# Patient Record
Sex: FEMALE | Race: WHITE | Employment: FULL TIME | URBAN - METROPOLITAN AREA
[De-identification: names, ages, dates, MRNs, and addresses within clinical notes are randomized per-mention and may not be internally consistent; named-entity substitution may affect disease eponyms.]

---

## 2017-01-25 PROCEDURE — 87591 N.GONORRHOEAE DNA AMP PROB: CPT | Performed by: OBSTETRICS & GYNECOLOGY

## 2017-01-25 PROCEDURE — 87491 CHLMYD TRACH DNA AMP PROBE: CPT | Performed by: OBSTETRICS & GYNECOLOGY

## 2017-01-25 PROCEDURE — 88175 CYTOPATH C/V AUTO FLUID REDO: CPT | Performed by: OBSTETRICS & GYNECOLOGY

## 2017-02-06 PROCEDURE — 83520 IMMUNOASSAY QUANT NOS NONAB: CPT | Performed by: OBSTETRICS & GYNECOLOGY

## 2017-02-06 PROCEDURE — 84146 ASSAY OF PROLACTIN: CPT | Performed by: OBSTETRICS & GYNECOLOGY

## 2017-02-06 PROCEDURE — 84443 ASSAY THYROID STIM HORMONE: CPT | Performed by: OBSTETRICS & GYNECOLOGY

## 2017-02-06 PROCEDURE — 84144 ASSAY OF PROGESTERONE: CPT | Performed by: OBSTETRICS & GYNECOLOGY

## 2017-02-06 PROCEDURE — 83002 ASSAY OF GONADOTROPIN (LH): CPT | Performed by: OBSTETRICS & GYNECOLOGY

## 2017-02-06 PROCEDURE — 36415 COLL VENOUS BLD VENIPUNCTURE: CPT | Performed by: OBSTETRICS & GYNECOLOGY

## 2017-02-06 PROCEDURE — 83001 ASSAY OF GONADOTROPIN (FSH): CPT | Performed by: OBSTETRICS & GYNECOLOGY

## 2017-02-06 PROCEDURE — 82670 ASSAY OF TOTAL ESTRADIOL: CPT | Performed by: OBSTETRICS & GYNECOLOGY

## 2017-02-15 PROCEDURE — 87510 GARDNER VAG DNA DIR PROBE: CPT | Performed by: OBSTETRICS & GYNECOLOGY

## 2017-02-15 PROCEDURE — 87480 CANDIDA DNA DIR PROBE: CPT | Performed by: OBSTETRICS & GYNECOLOGY

## 2017-02-15 PROCEDURE — 87660 TRICHOMONAS VAGIN DIR PROBE: CPT | Performed by: OBSTETRICS & GYNECOLOGY

## 2017-04-11 PROCEDURE — 88305 TISSUE EXAM BY PATHOLOGIST: CPT | Performed by: OBSTETRICS & GYNECOLOGY

## 2017-06-01 ENCOUNTER — OFFICE VISIT (OUTPATIENT)
Dept: INTERNAL MEDICINE CLINIC | Facility: CLINIC | Age: 32
End: 2017-06-01

## 2017-06-01 VITALS
RESPIRATION RATE: 12 BRPM | DIASTOLIC BLOOD PRESSURE: 60 MMHG | BODY MASS INDEX: 20.98 KG/M2 | HEART RATE: 76 BPM | TEMPERATURE: 98 F | WEIGHT: 114 LBS | HEIGHT: 62 IN | SYSTOLIC BLOOD PRESSURE: 80 MMHG

## 2017-06-01 DIAGNOSIS — E28.2 PCOS (POLYCYSTIC OVARIAN SYNDROME): ICD-10-CM

## 2017-06-01 DIAGNOSIS — Z78.9 ATTEMPTING TO CONCEIVE: ICD-10-CM

## 2017-06-01 DIAGNOSIS — N97.9 INFERTILITY, FEMALE: Primary | ICD-10-CM

## 2017-06-01 PROCEDURE — 99203 OFFICE O/P NEW LOW 30 MIN: CPT | Performed by: INTERNAL MEDICINE

## 2017-06-01 NOTE — PATIENT INSTRUCTIONS
- Wait for us to contact you before you schedule an appointment  - After your visit, if you need us to order blood work, call us and let us know. It was a pleasure seeing you in the clinic today.   Thank you for choosing the [address]

## 2017-06-01 NOTE — PROGRESS NOTES
Caitlyn Henley is a 29year old female. HPI:   Patient presents with:  Referral: to infertility doctor  Patient is a new patient, here to establish care.   She is in the process of setting up with an infertility doctor (Dr. Zoila Yates) and needs She reports that she drinks alcohol. She reports that she does not use illicit drugs.     Wt Readings from Last 6 Encounters:  06/01/17 : 114 lb  04/27/17 : 113 lb 6.4 oz  02/15/17 : 114 lb 9.6 oz  02/08/17 : 115 lb 1.6 oz  02/06/17 : 112 lb 3.2 oz  01/25/1

## 2017-06-09 ENCOUNTER — TELEPHONE (OUTPATIENT)
Dept: INTERNAL MEDICINE CLINIC | Facility: CLINIC | Age: 32
End: 2017-06-09

## 2017-06-09 NOTE — TELEPHONE ENCOUNTER
Agreed. Only other fertility MD I know is Dr. Jesus Tyson out of [place] but I'm not sure if he would be in-network for insurance, best bet would be for her to call insurance directly.   I can enter referral electronically once we have an in-network fe

## 2017-06-09 NOTE — TELEPHONE ENCOUNTER
----- Message -----   Vinh Humphreys From: Lonnie Cruz CNA      Sent: 6/9/2017  11:27 AM        To: [facility] Clinical Staff      Good Morning,   Dr. Kranthi Villa is not in the pt insurance network. Need new Doctor that's in network.

## 2017-06-16 DIAGNOSIS — N97.9 INFERTILITY, FEMALE: Primary | ICD-10-CM

## 2017-06-16 DIAGNOSIS — Z78.9 ATTEMPTING TO CONCEIVE: ICD-10-CM

## 2017-08-01 ENCOUNTER — TELEPHONE (OUTPATIENT)
Dept: INTERNAL MEDICINE CLINIC | Facility: CLINIC | Age: 32
End: 2017-08-01

## 2017-08-01 DIAGNOSIS — N97.9 FEMALE INFERTILITY: Primary | ICD-10-CM

## 2017-08-01 NOTE — TELEPHONE ENCOUNTER
Dr Ana Irene, this is not your patient it is Dr Jasmine Crews patient. Dr Jasmine Crews referral pending for Dr Asha Royal. Please approve if appropriate.  TY.

## 2017-08-01 NOTE — TELEPHONE ENCOUNTER
1. I received forwarded request from referrals department on this patient, specifically to see Dr. Gilberto Rubio from [facility]. 2. I've never seen this patient in the past, but Dr. Ger Morgan saw her in June.   He referred her over to Dr. Rosmery Pastrana

## 2017-08-02 NOTE — TELEPHONE ENCOUNTER
I ordered a referral last month. It is still in the system. It was approved for June - December 2017.   Do we need another referral?

## 2017-09-05 PROCEDURE — 80074 ACUTE HEPATITIS PANEL: CPT | Performed by: OBSTETRICS & GYNECOLOGY

## 2017-09-05 PROCEDURE — 87389 HIV-1 AG W/HIV-1&-2 AB AG IA: CPT | Performed by: OBSTETRICS & GYNECOLOGY

## 2017-09-05 PROCEDURE — 36415 COLL VENOUS BLD VENIPUNCTURE: CPT | Performed by: OBSTETRICS & GYNECOLOGY

## 2017-09-05 PROCEDURE — 86780 TREPONEMA PALLIDUM: CPT | Performed by: OBSTETRICS & GYNECOLOGY

## 2017-09-18 PROCEDURE — 81003 URINALYSIS AUTO W/O SCOPE: CPT | Performed by: OBSTETRICS & GYNECOLOGY

## 2017-09-18 PROCEDURE — 87086 URINE CULTURE/COLONY COUNT: CPT | Performed by: OBSTETRICS & GYNECOLOGY

## 2017-10-05 DIAGNOSIS — Z31.83 IN VITRO FERTILIZATION: ICD-10-CM

## 2017-10-05 DIAGNOSIS — N97.9 INFERTILITY, FEMALE: Primary | ICD-10-CM

## 2017-10-05 NOTE — PROGRESS NOTES
Additional visits referral ordered as requested for Dr. Wilder Cruz, Infertility/Ob-Gyn, 20 visits, dx female infertility/IVF.

## 2017-10-11 ENCOUNTER — OFFICE VISIT (OUTPATIENT)
Dept: INTERNAL MEDICINE CLINIC | Facility: CLINIC | Age: 32
End: 2017-10-11

## 2017-10-11 VITALS
RESPIRATION RATE: 21 BRPM | TEMPERATURE: 99 F | SYSTOLIC BLOOD PRESSURE: 100 MMHG | BODY MASS INDEX: 21.44 KG/M2 | DIASTOLIC BLOOD PRESSURE: 70 MMHG | HEART RATE: 84 BPM | WEIGHT: 116.5 LBS | HEIGHT: 62 IN

## 2017-10-11 DIAGNOSIS — R10.84 GENERALIZED ABDOMINAL PAIN: Primary | ICD-10-CM

## 2017-10-11 DIAGNOSIS — K59.00 CONSTIPATION, UNSPECIFIED CONSTIPATION TYPE: ICD-10-CM

## 2017-10-11 DIAGNOSIS — Z00.00 LABORATORY EXAM ORDERED AS PART OF ROUTINE GENERAL MEDICAL EXAMINATION: ICD-10-CM

## 2017-10-11 DIAGNOSIS — R11.2 NON-INTRACTABLE VOMITING WITH NAUSEA, UNSPECIFIED VOMITING TYPE: ICD-10-CM

## 2017-10-11 DIAGNOSIS — R35.0 URINARY FREQUENCY: ICD-10-CM

## 2017-10-11 PROCEDURE — 90686 IIV4 VACC NO PRSV 0.5 ML IM: CPT | Performed by: PHYSICIAN ASSISTANT

## 2017-10-11 PROCEDURE — 99214 OFFICE O/P EST MOD 30 MIN: CPT | Performed by: PHYSICIAN ASSISTANT

## 2017-10-11 PROCEDURE — 90471 IMMUNIZATION ADMIN: CPT | Performed by: PHYSICIAN ASSISTANT

## 2017-10-11 NOTE — PROGRESS NOTES
Scooter Chiang is a 29year old female. HPI:   Patient presents for multiple issues. Feels the whites of her eyes have been yellow for the past few weeks. C/o occ eye redness, irritation. Wears glasses. Uses OTC rewetting drops prn.   Denies exce hematuria  NEURO: denies headaches    EXAM:   /70 (BP Location: Right arm, Patient Position: Sitting, Cuff Size: adult)   Pulse 84   Temp 98.5 °F (36.9 °C) (Oral)   Resp 21   Ht 62\"   Wt 116 lb 8 oz   BMI 21.31 kg/m²   GENERAL: well developed, well

## 2017-10-11 NOTE — PATIENT INSTRUCTIONS
Allergies:  - start Claritin (loratadine) 10 mg - 1 tablet daily  - start Flonase - 2 sprays in each nostril daily    Abdominal Pain, Nausea, Vomiting, Constipation:  - increase water intake (at least 60 ounces a day)  - clean up your diet! -- focus on kailyn

## 2017-10-16 ENCOUNTER — LAB ENCOUNTER (OUTPATIENT)
Dept: LAB | Age: 32
End: 2017-10-16
Attending: PHYSICIAN ASSISTANT
Payer: COMMERCIAL

## 2017-10-16 DIAGNOSIS — R11.2 NON-INTRACTABLE VOMITING WITH NAUSEA, UNSPECIFIED VOMITING TYPE: ICD-10-CM

## 2017-10-16 DIAGNOSIS — R10.84 GENERALIZED ABDOMINAL PAIN: ICD-10-CM

## 2017-10-16 DIAGNOSIS — K59.00 CONSTIPATION, UNSPECIFIED CONSTIPATION TYPE: ICD-10-CM

## 2017-10-16 DIAGNOSIS — Z00.00 LABORATORY EXAM ORDERED AS PART OF ROUTINE GENERAL MEDICAL EXAMINATION: ICD-10-CM

## 2017-10-16 DIAGNOSIS — R35.0 URINARY FREQUENCY: ICD-10-CM

## 2017-10-16 PROCEDURE — 36415 COLL VENOUS BLD VENIPUNCTURE: CPT

## 2017-10-16 PROCEDURE — 84443 ASSAY THYROID STIM HORMONE: CPT

## 2017-10-16 PROCEDURE — 83036 HEMOGLOBIN GLYCOSYLATED A1C: CPT

## 2017-10-16 PROCEDURE — 81003 URINALYSIS AUTO W/O SCOPE: CPT

## 2017-10-16 PROCEDURE — 80061 LIPID PANEL: CPT

## 2017-10-16 PROCEDURE — 85025 COMPLETE CBC W/AUTO DIFF WBC: CPT

## 2017-10-16 PROCEDURE — 80053 COMPREHEN METABOLIC PANEL: CPT

## 2017-11-29 DIAGNOSIS — N97.9 FEMALE INFERTILITY: Primary | ICD-10-CM

## 2017-11-29 DIAGNOSIS — Z31.83 IN VITRO FERTILIZATION: ICD-10-CM

## 2017-11-29 NOTE — PROGRESS NOTES
Pt informed but stated that she will tentatively need more than 6 visits. Will contact fertility office and let us know.

## 2018-01-03 DIAGNOSIS — Z31.83 IN VITRO FERTILIZATION: ICD-10-CM

## 2018-01-03 DIAGNOSIS — N97.9 FEMALE INFERTILITY: Primary | ICD-10-CM

## 2018-02-15 PROCEDURE — 87660 TRICHOMONAS VAGIN DIR PROBE: CPT | Performed by: OBSTETRICS & GYNECOLOGY

## 2018-02-15 PROCEDURE — 87510 GARDNER VAG DNA DIR PROBE: CPT | Performed by: OBSTETRICS & GYNECOLOGY

## 2018-02-15 PROCEDURE — 87480 CANDIDA DNA DIR PROBE: CPT | Performed by: OBSTETRICS & GYNECOLOGY

## 2018-03-04 ENCOUNTER — APPOINTMENT (OUTPATIENT)
Dept: ULTRASOUND IMAGING | Facility: HOSPITAL | Age: 33
End: 2018-03-04
Attending: EMERGENCY MEDICINE
Payer: COMMERCIAL

## 2018-03-04 ENCOUNTER — HOSPITAL ENCOUNTER (EMERGENCY)
Facility: HOSPITAL | Age: 33
Discharge: HOME | End: 2018-03-04
Attending: EMERGENCY MEDICINE
Payer: COMMERCIAL

## 2018-03-04 VITALS
HEART RATE: 67 BPM | OXYGEN SATURATION: 100 % | DIASTOLIC BLOOD PRESSURE: 63 MMHG | SYSTOLIC BLOOD PRESSURE: 99 MMHG | HEIGHT: 62 IN | RESPIRATION RATE: 20 BRPM | BODY MASS INDEX: 21.16 KG/M2 | WEIGHT: 115 LBS

## 2018-03-04 DIAGNOSIS — O20.0 THREATENED MISCARRIAGE IN EARLY PREGNANCY: Primary | ICD-10-CM

## 2018-03-04 LAB
BASOPHILS # BLD AUTO: 0.03 X10(3) UL (ref 0–0.1)
BASOPHILS NFR BLD AUTO: 0.3 %
EOSINOPHIL # BLD AUTO: 0.13 X10(3) UL (ref 0–0.3)
EOSINOPHIL NFR BLD AUTO: 1.2 %
ERYTHROCYTE [DISTWIDTH] IN BLOOD BY AUTOMATED COUNT: 12.1 % (ref 11.5–16)
HCG QUANTITATIVE: ABNORMAL MIU/ML (ref ?–3)
HCT VFR BLD AUTO: 38.4 % (ref 34–50)
HGB BLD-MCNC: 13 G/DL (ref 12–16)
IMMATURE GRANULOCYTE COUNT: 0.03 X10(3) UL (ref 0–1)
IMMATURE GRANULOCYTE RATIO %: 0.3 %
LYMPHOCYTES # BLD AUTO: 1.7 X10(3) UL (ref 0.9–4)
LYMPHOCYTES NFR BLD AUTO: 16 %
MCH RBC QN AUTO: 31.4 PG (ref 27–33.2)
MCHC RBC AUTO-ENTMCNC: 33.9 G/DL (ref 31–37)
MCV RBC AUTO: 92.8 FL (ref 81–100)
MONOCYTES # BLD AUTO: 0.52 X10(3) UL (ref 0.1–1)
MONOCYTES NFR BLD AUTO: 4.9 %
NEUTROPHIL ABS PRELIM: 8.22 X10 (3) UL (ref 1.3–6.7)
NEUTROPHILS # BLD AUTO: 8.22 X10(3) UL (ref 1.3–6.7)
NEUTROPHILS NFR BLD AUTO: 77.3 %
PLATELET # BLD AUTO: 215 10(3)UL (ref 150–450)
RBC # BLD AUTO: 4.14 X10(6)UL (ref 3.8–5.1)
RED CELL DISTRIBUTION WIDTH-SD: 41.6 FL (ref 35.1–46.3)
RH BLOOD TYPE: POSITIVE
WBC # BLD AUTO: 10.6 X10(3) UL (ref 4–13)

## 2018-03-04 PROCEDURE — 99284 EMERGENCY DEPT VISIT MOD MDM: CPT

## 2018-03-04 PROCEDURE — 85025 COMPLETE CBC W/AUTO DIFF WBC: CPT | Performed by: EMERGENCY MEDICINE

## 2018-03-04 PROCEDURE — 76801 OB US < 14 WKS SINGLE FETUS: CPT | Performed by: EMERGENCY MEDICINE

## 2018-03-04 PROCEDURE — 86901 BLOOD TYPING SEROLOGIC RH(D): CPT | Performed by: EMERGENCY MEDICINE

## 2018-03-04 PROCEDURE — 84702 CHORIONIC GONADOTROPIN TEST: CPT | Performed by: EMERGENCY MEDICINE

## 2018-03-04 PROCEDURE — 86900 BLOOD TYPING SEROLOGIC ABO: CPT | Performed by: EMERGENCY MEDICINE

## 2018-03-04 PROCEDURE — 76817 TRANSVAGINAL US OBSTETRIC: CPT | Performed by: EMERGENCY MEDICINE

## 2018-03-04 PROCEDURE — 96360 HYDRATION IV INFUSION INIT: CPT

## 2018-03-04 RX ORDER — ESTRADIOL 2 MG/1
2 TABLET ORAL DAILY
COMMUNITY
End: 2018-03-21 | Stop reason: ALTCHOICE

## 2018-03-04 RX ORDER — PROGESTERONE 50 MG/ML
INJECTION, SOLUTION INTRAMUSCULAR DAILY
COMMUNITY
End: 2018-03-21 | Stop reason: ALTCHOICE

## 2018-03-04 NOTE — ED NOTES
Pelvic exam completed by MD Cristy Eubanks, patient ready for [address]. Patient tearing up due to possible inevitable miscarriage. Patient provided with tissues and emotional support.  remains at bedside.

## 2018-03-04 NOTE — ED PROVIDER NOTES
Patient Seen in: [facility] Emergency Department    History   Patient presents with:  Eval-G (gynecologic)    Stated Complaint: pregnant and bleeding    HPI    Patient complains of vaginal bleeding. Patient is about 9 weeks pregnant.   She had this diffusely tender over the suprapubic and lower abdomen. There is normal female external genitalia. Vaginal vault remarkable for scant amount of dark red mucousy blood.   There appears to be some mucousy blood at the office but the office is closed and no platelets  Blood type:  O positive  Beta-hCG 188,152    Ultrasound pelvis    I reviewed threatened miscarriage instructions  I recommend rest, fluids, pelvic rest, continue prenatal vitamins, and close follow-up with her gynecologist this week.     Case sig

## 2018-03-04 NOTE — ED NOTES
Patient to [address] via stretcher by this RN, patient in stable condition.

## 2018-03-04 NOTE — ED INITIAL ASSESSMENT (HPI)
Patient arrives with c/o possible miscarriage. Patient is 9 weeks pregnant via IVF. States she has had bleeding since six weeks pregnant, this morning blood turned bright red and c/o severe cramping. States she is passing clots. .  No lightheadedness or

## 2018-03-08 PROCEDURE — 85025 COMPLETE CBC W/AUTO DIFF WBC: CPT | Performed by: OBSTETRICS & GYNECOLOGY

## 2018-03-08 PROCEDURE — 36415 COLL VENOUS BLD VENIPUNCTURE: CPT | Performed by: OBSTETRICS & GYNECOLOGY

## 2018-03-08 PROCEDURE — 87389 HIV-1 AG W/HIV-1&-2 AB AG IA: CPT | Performed by: OBSTETRICS & GYNECOLOGY

## 2018-03-08 PROCEDURE — 86850 RBC ANTIBODY SCREEN: CPT | Performed by: OBSTETRICS & GYNECOLOGY

## 2018-03-08 PROCEDURE — 87591 N.GONORRHOEAE DNA AMP PROB: CPT | Performed by: OBSTETRICS & GYNECOLOGY

## 2018-03-08 PROCEDURE — 86901 BLOOD TYPING SEROLOGIC RH(D): CPT | Performed by: OBSTETRICS & GYNECOLOGY

## 2018-03-08 PROCEDURE — 86780 TREPONEMA PALLIDUM: CPT | Performed by: OBSTETRICS & GYNECOLOGY

## 2018-03-08 PROCEDURE — 86762 RUBELLA ANTIBODY: CPT | Performed by: OBSTETRICS & GYNECOLOGY

## 2018-03-08 PROCEDURE — 87086 URINE CULTURE/COLONY COUNT: CPT | Performed by: OBSTETRICS & GYNECOLOGY

## 2018-03-08 PROCEDURE — 87340 HEPATITIS B SURFACE AG IA: CPT | Performed by: OBSTETRICS & GYNECOLOGY

## 2018-03-08 PROCEDURE — 87491 CHLMYD TRACH DNA AMP PROBE: CPT | Performed by: OBSTETRICS & GYNECOLOGY

## 2018-03-08 PROCEDURE — 86900 BLOOD TYPING SEROLOGIC ABO: CPT | Performed by: OBSTETRICS & GYNECOLOGY

## 2018-04-13 PROBLEM — O09.812 PREGNANCY RESULTING FROM IN VITRO FERTILIZATION IN SECOND TRIMESTER: Status: ACTIVE | Noted: 2018-04-13

## 2018-04-13 PROBLEM — O09.812 PREGNANCY RESULTING FROM IN VITRO FERTILIZATION IN SECOND TRIMESTER (HCC): Status: ACTIVE | Noted: 2018-04-13

## 2018-05-07 PROCEDURE — 82105 ALPHA-FETOPROTEIN SERUM: CPT | Performed by: OBSTETRICS & GYNECOLOGY

## 2018-05-07 PROCEDURE — 36415 COLL VENOUS BLD VENIPUNCTURE: CPT | Performed by: OBSTETRICS & GYNECOLOGY

## 2018-05-19 NOTE — PROGRESS NOTES
Outpatient Maternal-Fetal Medicine Consultation    Dear Dr. Raymon Rodriges    Thank you for requesting ultrasound evaluation and maternal fetal medicine consultation on your patient Verdis Showers.   As you are aware she is a 34year old female  with a Si edema    OBSTETRIC ULTRASOUND  The patient had a level II ultrasound today which revealed size consistent with dates and a normal detailed anatomic survey. See Imaging Tab For U/S Report  I interpreted the results and reviewed them with the patient.     LESLEY

## 2018-05-21 ENCOUNTER — HOSPITAL ENCOUNTER (OUTPATIENT)
Dept: PERINATAL CARE | Facility: HOSPITAL | Age: 33
Discharge: HOME | End: 2018-05-21
Attending: OBSTETRICS & GYNECOLOGY
Payer: COMMERCIAL

## 2018-05-21 VITALS
DIASTOLIC BLOOD PRESSURE: 65 MMHG | HEART RATE: 83 BPM | WEIGHT: 129 LBS | HEIGHT: 61 IN | BODY MASS INDEX: 24.35 KG/M2 | SYSTOLIC BLOOD PRESSURE: 106 MMHG

## 2018-05-21 DIAGNOSIS — Z36.3 ENCOUNTER FOR ANTENATAL SCREENING FOR MALFORMATION USING ULTRASOUND: ICD-10-CM

## 2018-05-21 DIAGNOSIS — O09.812 PREGNANCY RESULTING FROM IN VITRO FERTILIZATION IN SECOND TRIMESTER: Primary | ICD-10-CM

## 2018-05-21 DIAGNOSIS — O09.812 PREGNANCY RESULTING FROM IN VITRO FERTILIZATION IN SECOND TRIMESTER: ICD-10-CM

## 2018-05-21 PROCEDURE — 99243 OFF/OP CNSLTJ NEW/EST LOW 30: CPT | Performed by: OBSTETRICS & GYNECOLOGY

## 2018-05-21 PROCEDURE — 76811 OB US DETAILED SNGL FETUS: CPT | Performed by: OBSTETRICS & GYNECOLOGY

## 2018-05-21 RX ORDER — MELATONIN
325
COMMUNITY
End: 2018-10-23 | Stop reason: ALTCHOICE

## 2018-06-04 PROBLEM — O09.619 SUPERVISION OF HIGH-RISK PREGNANCY OF YOUNG PRIMIGRAVIDA (HCC): Status: ACTIVE | Noted: 2018-06-04

## 2018-06-04 PROBLEM — O09.619 SUPERVISION OF HIGH-RISK PREGNANCY OF YOUNG PRIMIGRAVIDA: Status: ACTIVE | Noted: 2018-06-04

## 2018-06-12 ENCOUNTER — HOSPITAL ENCOUNTER (OUTPATIENT)
Dept: PERINATAL CARE | Facility: HOSPITAL | Age: 33
Discharge: HOME | End: 2018-06-12
Attending: OBSTETRICS & GYNECOLOGY
Payer: COMMERCIAL

## 2018-06-12 VITALS — HEART RATE: 85 BPM | DIASTOLIC BLOOD PRESSURE: 65 MMHG | SYSTOLIC BLOOD PRESSURE: 103 MMHG

## 2018-06-12 DIAGNOSIS — O09.812 PREGNANCY RESULTING FROM IN VITRO FERTILIZATION IN SECOND TRIMESTER: ICD-10-CM

## 2018-06-12 DIAGNOSIS — O09.812 PREGNANCY RESULTING FROM IN VITRO FERTILIZATION IN SECOND TRIMESTER: Primary | ICD-10-CM

## 2018-06-12 DIAGNOSIS — O09.619 SUPERVISION OF HIGH-RISK PREGNANCY OF YOUNG PRIMIGRAVIDA: ICD-10-CM

## 2018-06-12 PROCEDURE — 76825 ECHO EXAM OF FETAL HEART: CPT | Performed by: OBSTETRICS & GYNECOLOGY

## 2018-06-12 PROCEDURE — 76827 ECHO EXAM OF FETAL HEART: CPT | Performed by: OBSTETRICS & GYNECOLOGY

## 2018-06-12 PROCEDURE — 99213 OFFICE O/P EST LOW 20 MIN: CPT | Performed by: OBSTETRICS & GYNECOLOGY

## 2018-06-12 PROCEDURE — 93325 DOPPLER ECHO COLOR FLOW MAPG: CPT | Performed by: OBSTETRICS & GYNECOLOGY

## 2018-06-12 NOTE — PROGRESS NOTES
Outpatient Maternal-Fetal Medicine Consultation    Dear Dr. Greyson John    Thank you for requesting ultrasound evaluation and maternal fetal medicine consultation on your patient Krystal Anguiano.   As you are aware she is a 34year old female  with a Si ART does not appear to be an independent risk factor for adverse neurodevelopment outcome. ART appears to be at increased risk of delivering offspring with congenital malformations compared with fertile women who conceive naturally.  Heart defects have been pattern of the head vessels. There appears to be a structurally normal fetal heart and rhythm.   The patient was made aware of the limitations of fetal heart study: malformations such as but not limiting to minor valve, VSD or coarctation of the aort

## 2018-07-03 PROCEDURE — 82950 GLUCOSE TEST: CPT | Performed by: OBSTETRICS & GYNECOLOGY

## 2018-07-03 PROCEDURE — 86780 TREPONEMA PALLIDUM: CPT | Performed by: OBSTETRICS & GYNECOLOGY

## 2018-07-03 PROCEDURE — 87389 HIV-1 AG W/HIV-1&-2 AB AG IA: CPT | Performed by: OBSTETRICS & GYNECOLOGY

## 2018-07-11 PROCEDURE — 82951 GLUCOSE TOLERANCE TEST (GTT): CPT | Performed by: OBSTETRICS & GYNECOLOGY

## 2018-07-11 PROCEDURE — 36415 COLL VENOUS BLD VENIPUNCTURE: CPT | Performed by: OBSTETRICS & GYNECOLOGY

## 2018-07-11 PROCEDURE — 82952 GTT-ADDED SAMPLES: CPT | Performed by: OBSTETRICS & GYNECOLOGY

## 2018-07-18 PROCEDURE — 87086 URINE CULTURE/COLONY COUNT: CPT | Performed by: OBSTETRICS & GYNECOLOGY

## 2018-07-18 PROCEDURE — 81003 URINALYSIS AUTO W/O SCOPE: CPT | Performed by: OBSTETRICS & GYNECOLOGY

## 2018-07-20 ENCOUNTER — HOSPITAL ENCOUNTER (EMERGENCY)
Facility: HOSPITAL | Age: 33
Discharge: HOME | End: 2018-07-20
Payer: COMMERCIAL

## 2018-07-20 ENCOUNTER — HOSPITAL ENCOUNTER (OUTPATIENT)
Facility: HOSPITAL | Age: 33
Setting detail: OBSERVATION
Discharge: HOME | End: 2018-07-20
Attending: OBSTETRICS & GYNECOLOGY | Admitting: OBSTETRICS & GYNECOLOGY
Payer: COMMERCIAL

## 2018-07-20 VITALS
DIASTOLIC BLOOD PRESSURE: 56 MMHG | HEART RATE: 65 BPM | RESPIRATION RATE: 16 BRPM | HEIGHT: 62 IN | BODY MASS INDEX: 25.76 KG/M2 | WEIGHT: 140 LBS | SYSTOLIC BLOOD PRESSURE: 106 MMHG | TEMPERATURE: 99 F

## 2018-07-20 PROBLEM — Z34.90 NORMAL PREGNANCY: Status: ACTIVE | Noted: 2018-07-20

## 2018-07-20 PROBLEM — Z34.90 NORMAL PREGNANCY (HCC): Status: ACTIVE | Noted: 2018-07-20

## 2018-07-20 LAB
BILIRUBIN URINE: NEGATIVE
BLOOD URINE: NEGATIVE
CONTROL RUN WITHIN 24 HOURS?: YES
FETAL FIBRINECTIN: NEGATIVE
GLUCOSE URINE: NEGATIVE
KETONE URINE: NEGATIVE
LEUKOCYTE ESTERASE URINE: NEGATIVE
NITRITE URINE: NEGATIVE
PH URINE: 7 (ref 5–8)
PROTEIN URINE: NEGATIVE
SPEC GRAVITY: 1.01 (ref 1–1.03)
URINE COLOR: YELLOW
UROBILINOGEN URINE: 0.2

## 2018-07-20 PROCEDURE — 87081 CULTURE SCREEN ONLY: CPT | Performed by: OBSTETRICS & GYNECOLOGY

## 2018-07-20 PROCEDURE — 96360 HYDRATION IV INFUSION INIT: CPT

## 2018-07-20 PROCEDURE — 81002 URINALYSIS NONAUTO W/O SCOPE: CPT

## 2018-07-20 PROCEDURE — 82731 ASSAY OF FETAL FIBRONECTIN: CPT | Performed by: OBSTETRICS & GYNECOLOGY

## 2018-07-20 RX ORDER — SODIUM CHLORIDE, SODIUM LACTATE, POTASSIUM CHLORIDE, CALCIUM CHLORIDE 600; 310; 30; 20 MG/100ML; MG/100ML; MG/100ML; MG/100ML
INJECTION, SOLUTION INTRAVENOUS ONCE
Status: COMPLETED | OUTPATIENT
Start: 2018-07-20 | End: 2018-07-20

## 2018-07-20 NOTE — PROGRESS NOTES
Pt states that she feels better, and desires to go home. Results of ffn was negative and pt informed.

## 2018-07-20 NOTE — PROGRESS NOTES
Pt aaronien verbal and written discharge instructions and verbalized understanding of her instructions. Pt home ambulatory accompanied by her 2 young nieces and 1 nephew.  Pt instr to call her doctor to see if she would like to see her prior to her July 30th a

## 2018-07-20 NOTE — PROGRESS NOTES
Pt is  here with complaints of mild pain across the top of her stomach and is not heartburn. The pain is constant since yesterday causing the inability to sleep at night.  Pt here ambulatory, plan of care discussed

## 2018-07-30 PROBLEM — O24.410 GDM (GESTATIONAL DIABETES MELLITUS), CLASS A1 (HCC): Status: ACTIVE | Noted: 2018-07-30

## 2018-07-30 PROBLEM — O09.819 PREGNANCY RESULTING FROM IN VITRO FERTILIZATION, ANTEPARTUM: Status: ACTIVE | Noted: 2018-07-30

## 2018-07-30 PROBLEM — O24.410 GDM (GESTATIONAL DIABETES MELLITUS), CLASS A1: Status: ACTIVE | Noted: 2018-07-30

## 2018-07-30 PROBLEM — O09.819 PREGNANCY RESULTING FROM IN VITRO FERTILIZATION, ANTEPARTUM (HCC): Status: ACTIVE | Noted: 2018-07-30

## 2018-08-15 ENCOUNTER — HOSPITAL ENCOUNTER (OUTPATIENT)
Dept: PERINATAL CARE | Facility: HOSPITAL | Age: 33
Discharge: HOME | End: 2018-08-15
Attending: OBSTETRICS & GYNECOLOGY
Payer: COMMERCIAL

## 2018-08-15 VITALS — SYSTOLIC BLOOD PRESSURE: 105 MMHG | DIASTOLIC BLOOD PRESSURE: 49 MMHG | HEART RATE: 82 BPM

## 2018-08-15 DIAGNOSIS — O24.410 GDM (GESTATIONAL DIABETES MELLITUS), CLASS A1: ICD-10-CM

## 2018-08-15 DIAGNOSIS — O09.812 PREGNANCY RESULTING FROM IN VITRO FERTILIZATION IN SECOND TRIMESTER: ICD-10-CM

## 2018-08-15 DIAGNOSIS — O24.410 GDM (GESTATIONAL DIABETES MELLITUS), CLASS A1: Primary | ICD-10-CM

## 2018-08-15 PROCEDURE — 99213 OFFICE O/P EST LOW 20 MIN: CPT | Performed by: OBSTETRICS & GYNECOLOGY

## 2018-08-15 PROCEDURE — 76805 OB US >/= 14 WKS SNGL FETUS: CPT | Performed by: OBSTETRICS & GYNECOLOGY

## 2018-08-15 NOTE — PROGRESS NOTES
Outpatient Maternal-Fetal Medicine Consultation    Dear Dr. Adams    Thank you for requesting ultrasound evaluation and maternal fetal medicine consultation on your patient Adri Wells.   As you are aware she is a 34year old female  with a Si multiple gestations. The precise reasons for this increase in adverse outcomes are not clear. ART is associated with an up to two-fold increased risk of  birth and low birth weight in galdamez pregnancies.  ART does not appear to be an independen minutes.

## 2018-09-24 PROBLEM — O09.813 PREGNANCY RESULTING FROM IN VITRO FERTILIZATION IN THIRD TRIMESTER (HCC): Status: ACTIVE | Noted: 2018-04-13

## 2018-09-24 PROBLEM — O09.813 PREGNANCY RESULTING FROM IN VITRO FERTILIZATION IN THIRD TRIMESTER: Status: ACTIVE | Noted: 2018-04-13

## 2018-10-08 PROBLEM — O09.819 PREGNANCY RESULTING FROM IN VITRO FERTILIZATION, ANTEPARTUM (HCC): Status: RESOLVED | Noted: 2018-07-30 | Resolved: 2018-10-03

## 2018-10-08 PROBLEM — O09.819 PREGNANCY RESULTING FROM IN VITRO FERTILIZATION, ANTEPARTUM: Status: RESOLVED | Noted: 2018-07-30 | Resolved: 2018-10-03

## 2018-11-08 ENCOUNTER — OFFICE VISIT (OUTPATIENT)
Dept: INTERNAL MEDICINE CLINIC | Facility: CLINIC | Age: 33
End: 2018-11-08
Payer: COMMERCIAL

## 2018-11-08 VITALS
HEART RATE: 61 BPM | OXYGEN SATURATION: 98 % | RESPIRATION RATE: 14 BRPM | SYSTOLIC BLOOD PRESSURE: 96 MMHG | DIASTOLIC BLOOD PRESSURE: 52 MMHG | BODY MASS INDEX: 25.06 KG/M2 | WEIGHT: 132.75 LBS | TEMPERATURE: 98 F | HEIGHT: 61 IN

## 2018-11-08 DIAGNOSIS — R19.4 CHANGE IN BOWEL HABITS: Primary | ICD-10-CM

## 2018-11-08 DIAGNOSIS — K92.1 HEMATOCHEZIA: ICD-10-CM

## 2018-11-08 DIAGNOSIS — R10.32 LEFT GROIN PAIN: ICD-10-CM

## 2018-11-08 PROCEDURE — 99214 OFFICE O/P EST MOD 30 MIN: CPT | Performed by: INTERNAL MEDICINE

## 2018-11-08 RX ORDER — DICYCLOMINE HYDROCHLORIDE 10 MG/1
10 CAPSULE ORAL 3 TIMES DAILY
Qty: 90 CAPSULE | Refills: 1 | Status: SHIPPED | OUTPATIENT
Start: 2018-11-08 | End: 2019-01-09 | Stop reason: ALTCHOICE

## 2018-11-08 RX ORDER — OMEPRAZOLE 40 MG/1
40 CAPSULE, DELAYED RELEASE ORAL DAILY
Qty: 30 CAPSULE | Refills: 1 | Status: SHIPPED | OUTPATIENT
Start: 2018-11-08 | End: 2018-11-27 | Stop reason: ALTCHOICE

## 2018-11-08 NOTE — PATIENT INSTRUCTIONS
- Start acid medication (omeprazole). Take 1 capsule every morning before you eat anything.  - Start stomach cramping medication (dicyclomine). Take 1 capsule 3 times daily as needed. - If you are not better in 1 week, follow up with GI Erendira KITCHEN).

## 2018-11-08 NOTE — PROGRESS NOTES
Merlene Jamil is a 34year old female. HPI:   Patient presents with:  Abdominal Pain  Blood In Stool: Pt c/o abdominal discomfort to her left lower abdomen. States she has stitches in perineum area about 1 month ago during delivery.  States her bowel history of Allergic rhinitis, Anxiety, Depression, Genitourinary disease, Gestational diabetes, Herpes (03/13/2017), and Infertility, female.   Surgical:  has a past surgical history that includes other surgical history (Right); other surgical history (08/2 [place]. Patient reported sharp episodes of LLQ pain - it is actually more left groin pain on examination. Likely musculoskeletal.  Monitor for now.       Patient Care Team:  Gilberto Jimenez MD as PCP - General (Internal Medicine)  Dulcy Roots,

## 2018-11-09 ENCOUNTER — APPOINTMENT (OUTPATIENT)
Dept: CT IMAGING | Age: 33
End: 2018-11-09
Attending: EMERGENCY MEDICINE
Payer: COMMERCIAL

## 2018-11-09 ENCOUNTER — HOSPITAL ENCOUNTER (EMERGENCY)
Age: 33
Discharge: HOME | End: 2018-11-09
Attending: EMERGENCY MEDICINE
Payer: COMMERCIAL

## 2018-11-09 VITALS
DIASTOLIC BLOOD PRESSURE: 51 MMHG | SYSTOLIC BLOOD PRESSURE: 90 MMHG | BODY MASS INDEX: 23.37 KG/M2 | HEART RATE: 82 BPM | TEMPERATURE: 100 F | WEIGHT: 127 LBS | RESPIRATION RATE: 16 BRPM | HEIGHT: 62 IN | OXYGEN SATURATION: 97 %

## 2018-11-09 DIAGNOSIS — K52.9 ACUTE COLITIS: Primary | ICD-10-CM

## 2018-11-09 PROCEDURE — 87046 STOOL CULTR AEROBIC BACT EA: CPT | Performed by: EMERGENCY MEDICINE

## 2018-11-09 PROCEDURE — 99285 EMERGENCY DEPT VISIT HI MDM: CPT

## 2018-11-09 PROCEDURE — 82272 OCCULT BLD FECES 1-3 TESTS: CPT | Performed by: EMERGENCY MEDICINE

## 2018-11-09 PROCEDURE — 87086 URINE CULTURE/COLONY COUNT: CPT | Performed by: EMERGENCY MEDICINE

## 2018-11-09 PROCEDURE — 85025 COMPLETE CBC W/AUTO DIFF WBC: CPT | Performed by: EMERGENCY MEDICINE

## 2018-11-09 PROCEDURE — 74177 CT ABD & PELVIS W/CONTRAST: CPT | Performed by: EMERGENCY MEDICINE

## 2018-11-09 PROCEDURE — 87427 SHIGA-LIKE TOXIN AG IA: CPT | Performed by: EMERGENCY MEDICINE

## 2018-11-09 PROCEDURE — 96374 THER/PROPH/DIAG INJ IV PUSH: CPT

## 2018-11-09 PROCEDURE — 83690 ASSAY OF LIPASE: CPT | Performed by: EMERGENCY MEDICINE

## 2018-11-09 PROCEDURE — 87493 C DIFF AMPLIFIED PROBE: CPT | Performed by: EMERGENCY MEDICINE

## 2018-11-09 PROCEDURE — 80053 COMPREHEN METABOLIC PANEL: CPT | Performed by: EMERGENCY MEDICINE

## 2018-11-09 PROCEDURE — 96361 HYDRATE IV INFUSION ADD-ON: CPT

## 2018-11-09 PROCEDURE — 81001 URINALYSIS AUTO W/SCOPE: CPT | Performed by: EMERGENCY MEDICINE

## 2018-11-09 PROCEDURE — 87045 FECES CULTURE AEROBIC BACT: CPT | Performed by: EMERGENCY MEDICINE

## 2018-11-09 PROCEDURE — 99284 EMERGENCY DEPT VISIT MOD MDM: CPT

## 2018-11-09 RX ORDER — CIPROFLOXACIN 500 MG/1
500 TABLET, FILM COATED ORAL 2 TIMES DAILY
Qty: 14 TABLET | Refills: 0 | Status: SHIPPED | OUTPATIENT
Start: 2018-11-09 | End: 2018-11-16

## 2018-11-09 RX ORDER — ONDANSETRON 2 MG/ML
4 INJECTION INTRAMUSCULAR; INTRAVENOUS ONCE
Status: DISCONTINUED | OUTPATIENT
Start: 2018-11-09 | End: 2018-11-09

## 2018-11-09 RX ORDER — MORPHINE SULFATE 4 MG/ML
4 INJECTION, SOLUTION INTRAMUSCULAR; INTRAVENOUS ONCE
Status: DISCONTINUED | OUTPATIENT
Start: 2018-11-09 | End: 2018-11-09

## 2018-11-09 RX ORDER — METRONIDAZOLE 500 MG/1
500 TABLET ORAL 3 TIMES DAILY
Qty: 21 TABLET | Refills: 0 | Status: SHIPPED | OUTPATIENT
Start: 2018-11-09 | End: 2018-11-16

## 2018-11-09 RX ORDER — KETOROLAC TROMETHAMINE 30 MG/ML
30 INJECTION, SOLUTION INTRAMUSCULAR; INTRAVENOUS ONCE
Status: COMPLETED | OUTPATIENT
Start: 2018-11-09 | End: 2018-11-09

## 2018-11-10 NOTE — ED NOTES
Pt returned phone call.   Pt informed of positive c diff results, pt instructed to be sure to take her cipro and flagyl and to follow up with her PMD for repeat stool

## 2018-11-10 NOTE — ED PROVIDER NOTES
Patient Seen in: [facility] Emergency Department In [place]    History   Patient presents with:  GI Bleeding (gastrointestinal)    Stated Complaint: GI Bleeding    HPI    Patient presents with GI bleeding.   The patient is one-month postpartum from a vagin and negative except as noted above. Physical Exam     ED Triage Vitals [11/09/18 1758]   BP 99/68   Pulse 93   Resp 16   Temp 99.5 °F (37.5 °C)   Temp src Oral   SpO2 98 %   O2 Device None (Room air)       Current:BP 90/51   Pulse 82   Temp 99.5 °F (37. PLATELET.   Procedure                               Abnormality         Status                     ---------                               -----------         ------                     CBC W/ DIFFERENTIAL[[id number]]          Abnormal            Final resul PANCREAS:  No lesion, fluid collection, ductal dilatation, or atrophy. SPLEEN:  No enlargement or focal lesion. KIDNEYS:  Bilateral nonobstructing renal calculi, 3 on the right and 1 on the left, measuring up to 4 mm in the right upper pole.   No hydronep the plan. Her hemoglobin is normal as well as her white blood cell count. She was counseled regarding symptoms to return for. She will follow-up with GI next week.     Disposition and Plan     Clinical Impression:  Acute colitis  (primary encounter diagn

## 2018-11-27 ENCOUNTER — TELEPHONE (OUTPATIENT)
Dept: INTERNAL MEDICINE CLINIC | Facility: CLINIC | Age: 33
End: 2018-11-27

## 2018-11-27 PROCEDURE — 88175 CYTOPATH C/V AUTO FLUID REDO: CPT | Performed by: OBSTETRICS & GYNECOLOGY

## 2018-11-27 NOTE — TELEPHONE ENCOUNTER
I would await further recs from GI tomorrow. GI can then tell us specifically what they're treating. Maisha Coates. Manjeet Crowley MD  Diplomate, American Board of Internal Medicine  [address] [address] [address]  [address] [place] [address],[address], [address], [facility], [address] [place] [address]  T: [phone]

## 2018-11-27 NOTE — TELEPHONE ENCOUNTER
Pt called stating she requested refill on Metronidazole but now has appointment with [address] [place] [address] tomorrow at 3 pm.  Pt with mucous and small blood in stool since yesterday and thinks its related to possible Crohn's flare. Was seen in ER on 11/09.   Refill

## 2018-11-27 NOTE — TELEPHONE ENCOUNTER
Patient calling in requesting a refill for an RX she received at the ER   RX refill for: MetroNIDAZOLE 500 mg      To be sent to:    [facility] [address]  [address] [place] [address], [phone], [phone]

## 2018-12-26 ENCOUNTER — TELEPHONE (OUTPATIENT)
Dept: INTERNAL MEDICINE CLINIC | Facility: CLINIC | Age: 33
End: 2018-12-26

## 2018-12-26 DIAGNOSIS — A04.72 C. DIFFICILE COLITIS: Primary | ICD-10-CM

## 2018-12-26 RX ORDER — VANCOMYCIN HYDROCHLORIDE 125 MG/1
125 CAPSULE ORAL 4 TIMES DAILY
Qty: 56 CAPSULE | Refills: 0 | Status: SHIPPED | OUTPATIENT
Start: 2018-12-26 | End: 2019-01-09 | Stop reason: ALTCHOICE

## 2018-12-26 NOTE — TELEPHONE ENCOUNTER
Patient is out of states and is experiencing the same to similar symptoms of stomach issues from her last visit, which was on 11/08/18. Pt did see a GI specialist, and was wondering if Dr Jhonny Cisneros would prescribe what the GI gave to her.  Pt is unsure of

## 2018-12-26 NOTE — TELEPHONE ENCOUNTER
Pt states that last night she started experiencing vomiting and diarrhea. Pt stated that her stools are slightly bloody and mucus-like. Pt reported having at least 5 stools like that. Pt denies fever.  [facility] [place] [facility]

## 2018-12-26 NOTE — TELEPHONE ENCOUNTER
Called pt and left detailed message with provider response (ok per pt/HIPAA). Also advised pt to perform good hand hygiene with soap and water b/c previous infection (C.Diff) is not killed by using alcohol based cleansers.  Advised pt to call office back wi

## 2019-01-09 NOTE — PATIENT INSTRUCTIONS
- Start Adderall for concentration issues. Take 1 tablet daily.  - Start sertraline for depression. Take 1 tablet daily.    - Follow up with Psychiatry - I recommend calling your insurance company directly to see who is in network.   I will also have our tongue  · anxious  · breathing problems  · changes in emotions or moods  · changes in vision  · chest pain or chest tightness  · fast, irregular heartbeat  · fingers or toes feel numb, cool, painful  · hallucination, loss of contact with reality  · high bl pain  · quinidine  · ritonavir  · sodium bicarbonate  · Enlow's wort  What if I miss a dose? If you miss a dose, take it as soon as you can in the morning, but do not take it later in the day because it can cause trouble sleeping.  If it is almost time hide signs of tiredness. Until you know how this medicine affects you, do not drive, ride a bicycle, use machinery, or do anything that needs mental alertness. Alcohol should be avoided with some brands of this medicine.  Talk to your doctor or health care directions on the prescription label. You can take it with or without food. Take your medicine at regular intervals. Do not take your medicine more often than directed. Do not stop taking this medicine suddenly except upon the advice of your doctor.  Stoppi medications:  · certain medicines for fungal infections like fluconazole, itraconazole, ketoconazole, posaconazole, voriconazole  · cisapride  · disulfiram  · dofetilide  · linezolid  · MAOIs like Carbex, Eldepryl, Marplan, Nardil, and Parnate  · metronida disease  · high blood pressure  · history of irregular heartbeat  · history of low levels of calcium, magnesium, or potassium in the blood  · if you often drink alcohol  · liver disease  · receiving electroconvulsive therapy  · seizures  · suicidal thought If you have questions about this medicine, talk to your doctor, pharmacist, or health care provider. Copyright© 2018 [employer]  It was a pleasure seeing you in the clinic today.   Thank you for choosing the [facility] for your he

## 2019-01-09 NOTE — PROGRESS NOTES
Yuli Thomas is a 34year old female. HPI:   Patient presents with: Anxiety: Pt does not want to leave her home and suffers with anxiety. Not sure if its depression / post partum. Concentration: Pt thinks may have some concentration issues.    Arlene Robles Herpes (03/13/2017), History of depression, Infertility, female, Irregular bowel habits, Loss of appetite, Pain with bowel movements, Stool incontinence, and Uncomfortable fullness after meals.   Surgical:  has a past surgical history that includes other berg Amphetamine-Dextroamphet ER 10 MG Oral Capsule SR 24 Hr; Take 1 capsule (10 mg total) by mouth every morning. Dispense: 30 capsule; Refill: 0  - OP REFERRAL TO [facility]    2.  Postpartum depression  Increased anxiety and agitation; mildly depressed mood; s

## 2019-02-19 ENCOUNTER — TELEPHONE (OUTPATIENT)
Dept: INTERNAL MEDICINE CLINIC | Facility: CLINIC | Age: 34
End: 2019-02-19

## 2019-02-19 DIAGNOSIS — F90.0 ATTENTION DEFICIT HYPERACTIVITY DISORDER (ADHD), PREDOMINANTLY INATTENTIVE TYPE: ICD-10-CM

## 2019-02-19 NOTE — TELEPHONE ENCOUNTER
Pt last seen on 1/09 and due for 1 monthfollow-up. Pt  contacted to schedule follow-up. Pt stated she does not take Adderall every day and not really sure if she wants to continue as she dosen't likes the way it makes her feel.   Appointment scheduled with

## 2019-02-21 ENCOUNTER — LAB ENCOUNTER (OUTPATIENT)
Dept: LAB | Age: 34
End: 2019-02-21
Attending: INTERNAL MEDICINE
Payer: COMMERCIAL

## 2019-02-21 DIAGNOSIS — G89.29 CHRONIC PAIN OF MULTIPLE JOINTS: ICD-10-CM

## 2019-02-21 DIAGNOSIS — R53.83 FATIGUE, UNSPECIFIED TYPE: ICD-10-CM

## 2019-02-21 DIAGNOSIS — M25.50 CHRONIC PAIN OF MULTIPLE JOINTS: ICD-10-CM

## 2019-02-21 PROBLEM — O09.619 SUPERVISION OF HIGH-RISK PREGNANCY OF YOUNG PRIMIGRAVIDA (HCC): Status: RESOLVED | Noted: 2018-06-04 | Resolved: 2019-02-21

## 2019-02-21 PROBLEM — O09.619 SUPERVISION OF HIGH-RISK PREGNANCY OF YOUNG PRIMIGRAVIDA: Status: RESOLVED | Noted: 2018-06-04 | Resolved: 2019-02-21

## 2019-02-21 LAB
ALBUMIN SERPL-MCNC: 4.5 G/DL (ref 3.4–5)
ALBUMIN/GLOB SERPL: 1.1 {RATIO} (ref 1–2)
ALP LIVER SERPL-CCNC: 95 U/L (ref 37–98)
ALT SERPL-CCNC: 10 U/L (ref 13–56)
ANION GAP SERPL CALC-SCNC: 6 MMOL/L (ref 0–18)
AST SERPL-CCNC: 11 U/L (ref 15–37)
BASOPHILS # BLD AUTO: 0.03 X10(3) UL (ref 0–0.2)
BASOPHILS NFR BLD AUTO: 0.5 %
BILIRUB SERPL-MCNC: 0.6 MG/DL (ref 0.1–2)
BUN BLD-MCNC: 10 MG/DL (ref 7–18)
BUN/CREAT SERPL: 11.4 (ref 10–20)
CALCIUM BLD-MCNC: 9.3 MG/DL (ref 8.5–10.1)
CHLORIDE SERPL-SCNC: 105 MMOL/L (ref 98–107)
CO2 SERPL-SCNC: 27 MMOL/L (ref 21–32)
CREAT BLD-MCNC: 0.88 MG/DL (ref 0.55–1.02)
DEPRECATED RDW RBC AUTO: 46.8 FL (ref 35.1–46.3)
EOSINOPHIL # BLD AUTO: 0.14 X10(3) UL (ref 0–0.7)
EOSINOPHIL NFR BLD AUTO: 2.6 %
ERYTHROCYTE [DISTWIDTH] IN BLOOD BY AUTOMATED COUNT: 13.3 % (ref 11–15)
FOLATE SERPL-MCNC: 23.4 NG/ML (ref 8.7–?)
GLOBULIN PLAS-MCNC: 4.1 G/DL (ref 2.8–4.4)
GLUCOSE BLD-MCNC: 71 MG/DL (ref 70–99)
HCT VFR BLD AUTO: 42.7 % (ref 35–48)
HGB BLD-MCNC: 13.4 G/DL (ref 12–16)
IMM GRANULOCYTES # BLD AUTO: 0.01 X10(3) UL (ref 0–1)
IMM GRANULOCYTES NFR BLD: 0.2 %
LYMPHOCYTES # BLD AUTO: 2.13 X10(3) UL (ref 1–4)
LYMPHOCYTES NFR BLD AUTO: 38.9 %
M PROTEIN MFR SERPL ELPH: 8.6 G/DL (ref 6.4–8.2)
MCH RBC QN AUTO: 29.8 PG (ref 26–34)
MCHC RBC AUTO-ENTMCNC: 31.4 G/DL (ref 31–37)
MCV RBC AUTO: 94.9 FL (ref 80–100)
MONOCYTES # BLD AUTO: 0.42 X10(3) UL (ref 0.1–1)
MONOCYTES NFR BLD AUTO: 7.7 %
NEUTROPHILS # BLD AUTO: 2.74 X10 (3) UL (ref 1.5–7.7)
NEUTROPHILS # BLD AUTO: 2.74 X10(3) UL (ref 1.5–7.7)
NEUTROPHILS NFR BLD AUTO: 50.1 %
OSMOLALITY SERPL CALC.SUM OF ELEC: 284 MOSM/KG (ref 275–295)
PLATELET # BLD AUTO: 253 10(3)UL (ref 150–450)
POTASSIUM SERPL-SCNC: 3.9 MMOL/L (ref 3.5–5.1)
RBC # BLD AUTO: 4.5 X10(6)UL (ref 3.8–5.3)
RHEUMATOID FACT SERPL-ACNC: <10 IU/ML (ref ?–15)
SODIUM SERPL-SCNC: 138 MMOL/L (ref 136–145)
TSI SER-ACNC: 0.77 MIU/ML (ref 0.36–3.74)
VIT B12 SERPL-MCNC: 1249 PG/ML (ref 193–986)
VIT D+METAB SERPL-MCNC: 32.3 NG/ML (ref 30–100)
WBC # BLD AUTO: 5.5 X10(3) UL (ref 4–11)

## 2019-02-21 PROCEDURE — 85025 COMPLETE CBC W/AUTO DIFF WBC: CPT

## 2019-02-21 PROCEDURE — 80053 COMPREHEN METABOLIC PANEL: CPT

## 2019-02-21 PROCEDURE — 36415 COLL VENOUS BLD VENIPUNCTURE: CPT

## 2019-02-21 PROCEDURE — 86235 NUCLEAR ANTIGEN ANTIBODY: CPT

## 2019-02-21 PROCEDURE — 86431 RHEUMATOID FACTOR QUANT: CPT

## 2019-02-21 PROCEDURE — 82746 ASSAY OF FOLIC ACID SERUM: CPT

## 2019-02-21 PROCEDURE — 82306 VITAMIN D 25 HYDROXY: CPT

## 2019-02-21 PROCEDURE — 82607 VITAMIN B-12: CPT

## 2019-02-21 PROCEDURE — 86038 ANTINUCLEAR ANTIBODIES: CPT

## 2019-02-21 PROCEDURE — 84443 ASSAY THYROID STIM HORMONE: CPT

## 2019-02-21 PROCEDURE — 86200 CCP ANTIBODY: CPT

## 2019-02-21 PROCEDURE — 86225 DNA ANTIBODY NATIVE: CPT

## 2019-02-21 NOTE — PATIENT INSTRUCTIONS
- We will try the immediate release (short acting form) of Adderall. Take 1 tablet (5 mg) as needed most days.   On busier/longer days, you can take an additional tablet (5 mg) in the afternoon.    - Fine to skip days, in fact try and take the medication a

## 2019-02-21 NOTE — PROGRESS NOTES
Silke Chavez is a 34year old female. HPI:   Patient presents with:  Medication Follow-Up  Patient presents for follow up on ADHD symptoms. She was started on Adderall XR at last visit.   Working well, however she is concerned about long-term usage per day. she has never used smokeless tobacco. She reports that she does not drink alcohol or use drugs.   Wt Readings from Last 6 Encounters:  02/21/19 : 117 lb 12 oz  01/09/19 : 123 lb 8 oz  11/28/18 : 129 lb 9.6 oz  11/27/18 : 125 lb  11/09/18 : 127 lb PLATELET; Future  - COMP METABOLIC PANEL (14); Future  - TSH W REFLEX TO FREE T4; Future  - VITAMIN D, 25-HYDROXY; Future  - VITAMIN D85; Future  - FOLIC ACID SERUM(FOLATE); Future    3. Chronic pain of multiple joints  Multiple joints affected.   Has an ol

## 2019-02-22 LAB — CYCLIC CITRULLINATED PEPTIDE: 12 UNITS

## 2019-02-25 LAB
ANA SCREEN: POSITIVE
CENTROMERE AUTOAB: <100 AU/ML (ref ?–100)
DSDNA AUTOAB: 121 IU/ML (ref ?–100)
HISTONE AUTOAB: <100 AU/ML (ref ?–100)
JO-1 AUTOAB: <100 AU/ML (ref ?–100)
RNP AUTOAB: 226 AU/ML (ref ?–100)
SCL-70 AUTOAB: <100 AU/ML (ref ?–100)
SM AUTOAB (SMITH): <100 AU/ML (ref ?–100)
SSA AUTOAB: 132 AU/ML (ref ?–100)
SSB AUTOAB: <100 AU/ML (ref ?–100)

## 2019-02-26 DIAGNOSIS — M25.50 PAIN IN JOINTS: ICD-10-CM

## 2019-02-26 DIAGNOSIS — R53.82 CHRONIC FATIGUE: Primary | ICD-10-CM

## 2019-02-26 DIAGNOSIS — R76.0 ABNORMAL ANTINUCLEAR ANTIBODY TITER: ICD-10-CM

## 2019-03-01 ENCOUNTER — TELEPHONE (OUTPATIENT)
Dept: INTERNAL MEDICINE CLINIC | Facility: CLINIC | Age: 34
End: 2019-03-01

## 2019-03-05 ENCOUNTER — OFFICE VISIT (OUTPATIENT)
Dept: RHEUMATOLOGY | Age: 34
End: 2019-03-05

## 2019-03-05 VITALS — DIASTOLIC BLOOD PRESSURE: 62 MMHG | SYSTOLIC BLOOD PRESSURE: 90 MMHG | HEIGHT: 62 IN | HEART RATE: 80 BPM

## 2019-03-05 DIAGNOSIS — M35.9 UNDIFFERENTIATED CONNECTIVE TISSUE DISEASE (CMD): ICD-10-CM

## 2019-03-05 DIAGNOSIS — M25.50 ARTHRALGIA, UNSPECIFIED JOINT: ICD-10-CM

## 2019-03-05 DIAGNOSIS — R76.8 POSITIVE ANA (ANTINUCLEAR ANTIBODY): Primary | ICD-10-CM

## 2019-03-05 PROCEDURE — 99205 OFFICE O/P NEW HI 60 MIN: CPT | Performed by: INTERNAL MEDICINE

## 2019-03-05 RX ORDER — HYDROXYCHLOROQUINE SULFATE 200 MG/1
300 TABLET, FILM COATED ORAL DAILY
Qty: 45 TABLET | Refills: 3 | Status: SHIPPED | OUTPATIENT
Start: 2019-03-05

## 2019-03-05 RX ORDER — DEXTROAMPHETAMINE SACCHARATE, AMPHETAMINE ASPARTATE, DEXTROAMPHETAMINE SULFATE AND AMPHETAMINE SULFATE 1.25; 1.25; 1.25; 1.25 MG/1; MG/1; MG/1; MG/1
5 TABLET ORAL
COMMUNITY
Start: 2019-02-21

## 2019-03-05 SDOH — HEALTH STABILITY: MENTAL HEALTH: HOW OFTEN DO YOU HAVE A DRINK CONTAINING ALCOHOL?: NEVER

## 2019-03-08 ENCOUNTER — PATIENT MESSAGE (OUTPATIENT)
Dept: INTERNAL MEDICINE CLINIC | Facility: CLINIC | Age: 34
End: 2019-03-08

## 2019-03-08 DIAGNOSIS — G89.29 BILATERAL CHRONIC KNEE PAIN: ICD-10-CM

## 2019-03-08 DIAGNOSIS — M79.641 BILATERAL HAND PAIN: ICD-10-CM

## 2019-03-08 DIAGNOSIS — F90.0 ATTENTION DEFICIT HYPERACTIVITY DISORDER (ADHD), PREDOMINANTLY INATTENTIVE TYPE: ICD-10-CM

## 2019-03-08 DIAGNOSIS — M79.642 BILATERAL HAND PAIN: ICD-10-CM

## 2019-03-08 DIAGNOSIS — M25.562 BILATERAL CHRONIC KNEE PAIN: ICD-10-CM

## 2019-03-08 DIAGNOSIS — R76.0 ABNORMAL ANTINUCLEAR ANTIBODY TITER: ICD-10-CM

## 2019-03-08 DIAGNOSIS — M25.561 BILATERAL CHRONIC KNEE PAIN: ICD-10-CM

## 2019-03-08 DIAGNOSIS — M25.50 PAIN IN JOINT INVOLVING MULTIPLE SITES: Primary | ICD-10-CM

## 2019-03-08 RX ORDER — PREDNISONE 10 MG/1
10 TABLET ORAL SEE ADMIN INSTRUCTIONS
Qty: 30 TABLET | Refills: 0 | Status: SHIPPED | OUTPATIENT
Start: 2019-03-08 | End: 2019-04-08 | Stop reason: ALTCHOICE

## 2019-03-08 NOTE — TELEPHONE ENCOUNTER
From: Monet Reza  To: Samira Quintana MD  Sent: 3/8/2019 6:41 AM CST  Subject: Test Results Question    Hi Dr. Ger Morgan  I seen a rheumatologist last week and i have to wait til may to do more testing but i feel my joint pain is getting worse and wo

## 2019-03-11 RX ORDER — DEXTROAMPHETAMINE SACCHARATE, AMPHETAMINE ASPARTATE, DEXTROAMPHETAMINE SULFATE AND AMPHETAMINE SULFATE 1.25; 1.25; 1.25; 1.25 MG/1; MG/1; MG/1; MG/1
5 TABLET ORAL EVERY EVENING
Qty: 30 TABLET | Refills: 0 | Status: SHIPPED | OUTPATIENT
Start: 2019-03-11 | End: 2019-04-10

## 2019-03-11 RX ORDER — DEXTROAMPHETAMINE SACCHARATE, AMPHETAMINE ASPARTATE, DEXTROAMPHETAMINE SULFATE AND AMPHETAMINE SULFATE 2.5; 2.5; 2.5; 2.5 MG/1; MG/1; MG/1; MG/1
10 TABLET ORAL EVERY MORNING
Qty: 30 TABLET | Refills: 0 | Status: SHIPPED | OUTPATIENT
Start: 2019-03-11 | End: 2019-05-13 | Stop reason: ALTCHOICE

## 2019-03-12 ENCOUNTER — HOSPITAL ENCOUNTER (OUTPATIENT)
Dept: GENERAL RADIOLOGY | Age: 34
Discharge: HOME | End: 2019-03-12
Attending: INTERNAL MEDICINE
Payer: COMMERCIAL

## 2019-03-12 DIAGNOSIS — M25.50 PAIN IN JOINT INVOLVING MULTIPLE SITES: ICD-10-CM

## 2019-03-12 DIAGNOSIS — M79.641 BILATERAL HAND PAIN: ICD-10-CM

## 2019-03-12 DIAGNOSIS — R76.0 ABNORMAL ANTINUCLEAR ANTIBODY TITER: ICD-10-CM

## 2019-03-12 DIAGNOSIS — M25.562 BILATERAL CHRONIC KNEE PAIN: ICD-10-CM

## 2019-03-12 DIAGNOSIS — G89.29 BILATERAL CHRONIC KNEE PAIN: ICD-10-CM

## 2019-03-12 DIAGNOSIS — M25.561 BILATERAL CHRONIC KNEE PAIN: ICD-10-CM

## 2019-03-12 DIAGNOSIS — M79.642 BILATERAL HAND PAIN: ICD-10-CM

## 2019-03-12 PROCEDURE — 73565 X-RAY EXAM OF KNEES: CPT | Performed by: INTERNAL MEDICINE

## 2019-03-12 PROCEDURE — 73130 X-RAY EXAM OF HAND: CPT | Performed by: INTERNAL MEDICINE

## 2019-04-02 RX ORDER — NAPROXEN 500 MG/1
500 TABLET ORAL 2 TIMES DAILY WITH MEALS
Qty: 30 TABLET | Refills: 0 | Status: SHIPPED | OUTPATIENT
Start: 2019-04-02 | End: 2019-04-08 | Stop reason: ALTCHOICE

## 2019-04-03 PROBLEM — O24.410 GDM (GESTATIONAL DIABETES MELLITUS), CLASS A1 (HCC): Status: RESOLVED | Noted: 2018-07-30 | Resolved: 2019-04-03

## 2019-04-03 PROBLEM — O24.410 GDM (GESTATIONAL DIABETES MELLITUS), CLASS A1: Status: RESOLVED | Noted: 2018-07-30 | Resolved: 2019-04-03

## 2019-04-08 PROBLEM — A04.72 CLOSTRIDIUM DIFFICILE COLITIS: Status: ACTIVE | Noted: 2019-04-08

## 2019-04-08 PROCEDURE — 86235 NUCLEAR ANTIGEN ANTIBODY: CPT | Performed by: INTERNAL MEDICINE

## 2019-04-08 PROCEDURE — 84443 ASSAY THYROID STIM HORMONE: CPT | Performed by: INTERNAL MEDICINE

## 2019-04-08 PROCEDURE — 86160 COMPLEMENT ANTIGEN: CPT | Performed by: INTERNAL MEDICINE

## 2019-04-08 PROCEDURE — 86038 ANTINUCLEAR ANTIBODIES: CPT | Performed by: INTERNAL MEDICINE

## 2019-04-08 PROCEDURE — 83516 IMMUNOASSAY NONANTIBODY: CPT | Performed by: INTERNAL MEDICINE

## 2019-04-08 PROCEDURE — 86225 DNA ANTIBODY NATIVE: CPT | Performed by: INTERNAL MEDICINE

## 2019-04-10 NOTE — PROGRESS NOTES
Maverick Lewis is a 30year old female. HPI:   Patient presents with:  ADHD  Medication Follow-Up  Patient presents for follow up on chronic medical issues. Since last visit, after patient sent message, we increased her AM Adderall dose to 10 mg.   P Tab, Take 1 tablet (5 mg total) by mouth every evening., Disp: 30 tablet, Rfl: 0  •  [START ON 6/9/2019] amphetamine-dextroamphetamine 5 MG Oral Tab, Take 1 tablet (5 mg total) by mouth every evening., Disp: 30 tablet, Rfl: 0  •  Acetaminophen (TYLENOL OR) nourished,in no apparent distress  HEENT: atraumatic, PERRLA, EOMI, normal lid and conjunctiva  LUNGS: clear to auscultation bilaterally, no wheezing/rubs  CARDIO: RRR without murmurs. No clubbing, cyanosis or edema.   GI: soft non tender nondistended no h Saw a couple of rheumatologists, has appointment with Dr. Pablo Banerjee next month. Steroids did temporarily help symptoms. Initial [facility] rheumatologist prescribed Plaquenil but patient never started it.  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

## 2019-04-10 NOTE — PATIENT INSTRUCTIONS
- Continue current Adderall dosing. 10 mg in morning, 5 mg in afternoon/evening as needed. - Follow up with our Rheumatologist, Dr. Cynthia Sheppard, as scheduled next month. - Follow up with Dr. Yuval Shelton (eye doctor) for your eye issues (dryness/sensitivity).

## 2019-04-12 PROBLEM — H04.123 DRY EYES: Status: ACTIVE | Noted: 2019-04-12

## 2019-04-12 PROBLEM — F90.0 ATTENTION DEFICIT HYPERACTIVITY DISORDER (ADHD), PREDOMINANTLY INATTENTIVE TYPE: Status: ACTIVE | Noted: 2019-04-12

## 2019-04-12 PROBLEM — A04.72 CLOSTRIDIUM DIFFICILE COLITIS: Status: RESOLVED | Noted: 2019-04-08 | Resolved: 2019-04-12

## 2019-05-02 ENCOUNTER — OFFICE VISIT (OUTPATIENT)
Dept: RHEUMATOLOGY | Facility: CLINIC | Age: 34
End: 2019-05-02
Payer: COMMERCIAL

## 2019-05-02 VITALS
TEMPERATURE: 99 F | DIASTOLIC BLOOD PRESSURE: 65 MMHG | SYSTOLIC BLOOD PRESSURE: 101 MMHG | HEIGHT: 62 IN | WEIGHT: 114 LBS | RESPIRATION RATE: 20 BRPM | HEART RATE: 99 BPM | BODY MASS INDEX: 20.98 KG/M2

## 2019-05-02 DIAGNOSIS — I73.00 RAYNAUD'S PHENOMENON WITHOUT GANGRENE: ICD-10-CM

## 2019-05-02 DIAGNOSIS — M79.642 BILATERAL HAND PAIN: ICD-10-CM

## 2019-05-02 DIAGNOSIS — H04.123 DRY EYES: ICD-10-CM

## 2019-05-02 DIAGNOSIS — M79.641 BILATERAL HAND PAIN: ICD-10-CM

## 2019-05-02 DIAGNOSIS — M25.562 PAIN IN BOTH KNEES, UNSPECIFIED CHRONICITY: ICD-10-CM

## 2019-05-02 DIAGNOSIS — E55.9 VITAMIN D DEFICIENCY: ICD-10-CM

## 2019-05-02 DIAGNOSIS — L65.9 HAIR THINNING: ICD-10-CM

## 2019-05-02 DIAGNOSIS — M25.50 POLYARTHRALGIA: ICD-10-CM

## 2019-05-02 DIAGNOSIS — R76.8 POSITIVE ANA (ANTINUCLEAR ANTIBODY): Primary | ICD-10-CM

## 2019-05-02 DIAGNOSIS — M25.561 PAIN IN BOTH KNEES, UNSPECIFIED CHRONICITY: ICD-10-CM

## 2019-05-02 PROCEDURE — 99244 OFF/OP CNSLTJ NEW/EST MOD 40: CPT | Performed by: INTERNAL MEDICINE

## 2019-05-02 RX ORDER — ERGOCALCIFEROL 1.25 MG/1
50000 CAPSULE ORAL WEEKLY
Qty: 12 CAPSULE | Refills: 0 | Status: SHIPPED | OUTPATIENT
Start: 2019-05-02 | End: 2019-09-05 | Stop reason: ALTCHOICE

## 2019-05-02 RX ORDER — MELOXICAM 7.5 MG/1
7.5 TABLET ORAL DAILY
Qty: 60 TABLET | Refills: 0 | Status: SHIPPED | OUTPATIENT
Start: 2019-05-02 | End: 2019-09-05 | Stop reason: ALTCHOICE

## 2019-05-02 NOTE — PROGRESS NOTES
?  RHEUMATOLOGY NEW PATIENT   Date of visit: 5/2/2019  ?   Patient presents with:  Establish Care: Here for a second opinion, Dr Fidelina Farr, refered patient to [facility],,joint pain,muscle pain fatigue,no swelling dx with Vit D deficiency but not started on many patients, a positive DRE does not correlate to a pathologic process and is one of 11 criteria for the diagnosis of SLE.  A positive DRE can be caused by many pathologic states, such as SLE, Scleroderma Spectrum Disorders, Sjogren Syndrome, and at times increased fatigue and joint pain in her knees and her hands. Has noticed pain in fingers and wrists and had difficulty with grasping objects/opening jars. Fatigue has been present for many years (at least 10 years). Wakes up feeling unrested.  Feels like of lip 2-3x/year. Believes she has Raynaud's for years. Can get numbness/tingling of fingertip with color change to white. Has not been an issues recently. Does feel like it happens with cold temperatures but also at random times as well.    Cool fingers Surgical History:   Procedure Laterality Date   • OTHER      right knee arthroscopy   • OTHER SURGICAL HISTORY Right     right knee surgery    • OTHER SURGICAL HISTORY  08/2017    IUI     Family History:  Family History   Problem Relation Age of Onset   • for palpitations. Gastrointestinal: Negative. Genitourinary: Negative. Musculoskeletal: Positive for back pain, joint pain and myalgias. Negative for neck pain. Skin: Negative. Neurological: Negative.     Endo/Heme/Allergies: Positive for envir cervical adenopathy. Neurological: She is alert and oriented to person, place, and time. No cranial nerve deficit. Skin: Skin is warm and dry. No rash noted. She is not diaphoretic. No erythema. No malar rash  No periungal erythema    Psychiatric:  Sh other acute bony process. Dictated by: Toshia Duarte MD on 3/12/2019 at 10:02         PROCEDURE:  XR HAND (MIN 3 VIEWS), LEFT (CPT=73130)     TECHNIQUE:  Three views were obtained. COMPARISON:  None.      INDICATIONS:  R76.0 Raised antibody titer 4.1 02/21/2019     02/21/2019    K 3.9 02/21/2019     02/21/2019    CO2 27.0 02/21/2019       Additional Labs:  04/2019  SSA/SSB neg  Wylie neg  RNP neg  Scl70 neg  dsDNA neg  Chromatin neg   Ferritin normal   Vit D 19.5 (low)  ESR normal  CRP

## 2019-05-13 ENCOUNTER — TELEPHONE (OUTPATIENT)
Dept: INTERNAL MEDICINE CLINIC | Facility: CLINIC | Age: 34
End: 2019-05-13

## 2019-05-13 NOTE — TELEPHONE ENCOUNTER
Patient now states she is ok with generic prescriptions, but needs Sandoz . Prescriptions again printed out.

## 2019-05-13 NOTE — TELEPHONE ENCOUNTER
Patient picked up Adderall refill and it was generic form; it made her feel sick.  She would like a nurse to call to triage/discuss

## 2019-05-13 NOTE — TELEPHONE ENCOUNTER
Prescriptions given to pt - she returned other prescriptions   1. May 10 Adderall 5 mg   2 June 9 Adderall 10 mg  3. June 9 Adderall 5 mg     -states she did not get July rx and that pharmacy will call to make sure it is ok to dispense rx.  She will bring b

## 2019-05-13 NOTE — TELEPHONE ENCOUNTER
Rebecca Coulter from [address] [place] [address] is calling to get clarification on the prescription for amphetamine-dextroamphetamine (ADDERALL) 10 MG Oral Tab. Please advise.

## 2019-05-13 NOTE — TELEPHONE ENCOUNTER
Pt had generic Adderall filled and c/o nausea, warm sensation and tiredness. Pt usually takes Brand only. Requesting 3 new scripts for Brand only. Pt will surrender the remaining 2 printed scripts.   Pt informed insurance may not cover brand or another

## 2019-05-13 NOTE — ADDENDUM NOTE
Addended Karli Oro on: 5/13/2019 10:41 AM     Modules accepted: Orders Will review at upcoming visit

## 2019-05-13 NOTE — TELEPHONE ENCOUNTER
Brand name prescriptions printed out. She will need to surrender remaining prescriptions. She should also bring generic Adderall bottles back to pharmacy so they can recycle/dispose of them for her.

## 2019-07-02 ENCOUNTER — TELEPHONE (OUTPATIENT)
Dept: INTERNAL MEDICINE CLINIC | Facility: CLINIC | Age: 34
End: 2019-07-02

## 2019-07-02 NOTE — TELEPHONE ENCOUNTER
From: Silke Showers  To: Jose A Guerrero MD  Sent: 6/28/2019 12:49 PM CDT  Subject: Prescription Question    Hi Dr. Samira Zhang,    When i fill my prescription for the adderall can we move my 5mg to 10mg in the afternoon.  I take 10mg in the morning at abo

## 2019-07-09 ENCOUNTER — TELEPHONE (OUTPATIENT)
Dept: INTERNAL MEDICINE CLINIC | Facility: CLINIC | Age: 34
End: 2019-07-09

## 2019-07-09 NOTE — TELEPHONE ENCOUNTER
New prescription for Adderal was given to patient and she dropped it off at pharmacy dated 7/12/19. Pharmarcist states they can't refill early if date is not the same day.  Patient would need a new one

## 2019-07-10 NOTE — TELEPHONE ENCOUNTER
I spoke with pt and inquired when she is looking to have rx filled. She stated that she would like RX filled on 7/11/19 if possible. Informed pt that I can send request to Dr Ramón Templeton, but she will need to  new RX from our office.       Pt stated

## 2019-08-07 ENCOUNTER — PATIENT MESSAGE (OUTPATIENT)
Dept: INTERNAL MEDICINE CLINIC | Facility: CLINIC | Age: 34
End: 2019-08-07

## 2019-08-07 DIAGNOSIS — F90.0 ATTENTION DEFICIT HYPERACTIVITY DISORDER (ADHD), PREDOMINANTLY INATTENTIVE TYPE: ICD-10-CM

## 2019-08-07 RX ORDER — DEXTROAMPHETAMINE SACCHARATE, AMPHETAMINE ASPARTATE, DEXTROAMPHETAMINE SULFATE AND AMPHETAMINE SULFATE 2.5; 2.5; 2.5; 2.5 MG/1; MG/1; MG/1; MG/1
10 TABLET ORAL 2 TIMES DAILY
Qty: 60 TABLET | Refills: 0 | Status: SHIPPED | OUTPATIENT
Start: 2019-08-11 | End: 2019-09-10

## 2019-08-07 NOTE — TELEPHONE ENCOUNTER
From: Doe Rogers  To: Jay Pinto MD  Sent: 8/7/2019 10:46 AM CDT  Subject: Prescription Question    Hi Dr Mallory Hampton,    I was wondering if i needed to make an appointment to come see you or do i just  my prescription again this month?

## 2019-09-05 NOTE — PATIENT INSTRUCTIONS
-  Continue with 10 mg Adderall twice daily. - Take 5 mg tablet in the morning every other day as needed. You can take it less often that if you can (the less you take the extra 5 mg tablets the better).     It was a pleasure seeing you in the clinic toda

## 2019-09-05 NOTE — PROGRESS NOTES
Doe Rogers is a 30year old female. HPI:   Patient presents with:  ADHD    Patient presents for follow up on chronic medical issues. ADHD - up to 10 mg BID dosing.   She was planning on staying on this higher dose while in season at work - however Gestational diabetes, Herpes (03/13/2017), Infertility, female, Loss of appetite, Pain with bowel movements, Stool incontinence, Supervision of high-risk pregnancy of young primigravida (6/4/2018), and Uncomfortable fullness after meals.   Surgical:  has a take no more than every other day). 2. Decreased social interaction  Could be depressive symptoms. She is just over a year from delivery, thus no longer in post-partum depression range. Will refer to I for further evaluation/therapy.     Patient Care

## 2019-12-09 NOTE — PROGRESS NOTES
Adilene León is a 30year old female. HPI:   Patient presents with:  ADHD: DEclined Flu   Depression    Patient presents for follow up on chronic issues. Here with . ADHD - doing ok on current dose.   Sometimes she has a little bit left ove every other day. Sandoz only, patient had side effects with Teva generic form, Disp: 15 tablet, Rfl: 0  •  [START ON 2/9/2020] amphetamine-dextroamphetamine 5 MG Oral Tab, Take 1 tablet (5 mg total) by mouth every other day.  Sandoz only, patient had side e clubbing, cyanosis or edema. GI: soft non tender nondistended no hepatosplenomegaly, bowel sounds throughout  PSYCH: pleasant, appropriate mood and affect  ASSESSMENT AND PLAN:   1.  Attention deficit hyperactivity disorder (ADHD), predominantly inattentiv Care Team:  Megha Rodriguez MD as PCP - General (Internal Medicine)  Megha Rodriguez MD (Internal Medicine)  The patient indicates understanding of these issues and agrees to the plan.   The patient is asked to return to clinic in 6 months with myself for

## 2019-12-09 NOTE — PATIENT INSTRUCTIONS
- Adderall refilled. We will keep you at the current dose for now. - I entered a new referral for our therapist here in the office Evelin Blevins) - she is back from maternity leave. She will reach out to you within the next week.    - Follow up in 6 months for

## 2019-12-18 NOTE — TELEPHONE ENCOUNTER
Patient was referred to rheumatologist Dr. Damaso Hines. Patient states that she scheduled an appointment with another doctor through [address] [place] [address],[address], which her insurance confirmed is in her network.  Requesting new referral    Patient was unsure of the na
Patient will call [facility] to find out the name of the rheumatologist
We can not assure a referral will be authorize w the provider phone number. Please call pt back and ask them to confirm the name.
7

## 2020-03-09 DIAGNOSIS — F90.0 ATTENTION DEFICIT HYPERACTIVITY DISORDER (ADHD), PREDOMINANTLY INATTENTIVE TYPE: ICD-10-CM

## 2020-03-09 RX ORDER — DEXTROAMPHETAMINE SACCHARATE, AMPHETAMINE ASPARTATE, DEXTROAMPHETAMINE SULFATE AND AMPHETAMINE SULFATE 2.5; 2.5; 2.5; 2.5 MG/1; MG/1; MG/1; MG/1
10 TABLET ORAL 2 TIMES DAILY
Qty: 60 TABLET | Refills: 0 | Status: CANCELLED | OUTPATIENT
Start: 2020-03-09 | End: 2020-04-08

## 2020-03-09 RX ORDER — DEXTROAMPHETAMINE SACCHARATE, AMPHETAMINE ASPARTATE, DEXTROAMPHETAMINE SULFATE AND AMPHETAMINE SULFATE 2.5; 2.5; 2.5; 2.5 MG/1; MG/1; MG/1; MG/1
10 TABLET ORAL 2 TIMES DAILY
Qty: 60 TABLET | Refills: 0 | Status: SHIPPED | OUTPATIENT
Start: 2020-05-08 | End: 2020-05-06

## 2020-03-09 RX ORDER — DEXTROAMPHETAMINE SACCHARATE, AMPHETAMINE ASPARTATE, DEXTROAMPHETAMINE SULFATE AND AMPHETAMINE SULFATE 2.5; 2.5; 2.5; 2.5 MG/1; MG/1; MG/1; MG/1
10 TABLET ORAL 2 TIMES DAILY
Qty: 60 TABLET | Refills: 0 | Status: SHIPPED | OUTPATIENT
Start: 2020-03-09 | End: 2020-03-10

## 2020-03-09 RX ORDER — DEXTROAMPHETAMINE SACCHARATE, AMPHETAMINE ASPARTATE, DEXTROAMPHETAMINE SULFATE AND AMPHETAMINE SULFATE 1.25; 1.25; 1.25; 1.25 MG/1; MG/1; MG/1; MG/1
5 TABLET ORAL EVERY OTHER DAY
Qty: 15 TABLET | Refills: 0 | Status: SHIPPED | OUTPATIENT
Start: 2020-03-09 | End: 2020-03-25

## 2020-03-09 RX ORDER — DEXTROAMPHETAMINE SACCHARATE, AMPHETAMINE ASPARTATE, DEXTROAMPHETAMINE SULFATE AND AMPHETAMINE SULFATE 2.5; 2.5; 2.5; 2.5 MG/1; MG/1; MG/1; MG/1
10 TABLET ORAL 2 TIMES DAILY
Qty: 60 TABLET | Refills: 0 | Status: SHIPPED | OUTPATIENT
Start: 2020-04-08 | End: 2020-04-09

## 2020-03-09 RX ORDER — DEXTROAMPHETAMINE SACCHARATE, AMPHETAMINE ASPARTATE, DEXTROAMPHETAMINE SULFATE AND AMPHETAMINE SULFATE 1.25; 1.25; 1.25; 1.25 MG/1; MG/1; MG/1; MG/1
5 TABLET ORAL EVERY OTHER DAY
Qty: 15 TABLET | Refills: 0 | Status: CANCELLED | OUTPATIENT
Start: 2020-03-09 | End: 2020-04-08

## 2020-03-09 RX ORDER — DEXTROAMPHETAMINE SACCHARATE, AMPHETAMINE ASPARTATE, DEXTROAMPHETAMINE SULFATE AND AMPHETAMINE SULFATE 1.25; 1.25; 1.25; 1.25 MG/1; MG/1; MG/1; MG/1
5 TABLET ORAL EVERY OTHER DAY
Qty: 15 TABLET | Refills: 0 | Status: SHIPPED | OUTPATIENT
Start: 2020-05-08 | End: 2020-03-25

## 2020-03-09 RX ORDER — DEXTROAMPHETAMINE SACCHARATE, AMPHETAMINE ASPARTATE, DEXTROAMPHETAMINE SULFATE AND AMPHETAMINE SULFATE 1.25; 1.25; 1.25; 1.25 MG/1; MG/1; MG/1; MG/1
5 TABLET ORAL EVERY OTHER DAY
Qty: 15 TABLET | Refills: 0 | Status: SHIPPED | OUTPATIENT
Start: 2020-04-08 | End: 2020-03-25

## 2020-03-09 NOTE — TELEPHONE ENCOUNTER
No protocol     Last refill:  2/9/2020 Adderall 10 mg #60 NR  2/9/2020 Adderall 5 mg #15 NR    LOV:   12/9/2019 Dr Migdalia Winters RTC 6 months  1.  Attention deficit hyperactivity disorder (ADHD), predominantly inattentive type  Stable on current regimen - some mo

## 2020-03-10 ENCOUNTER — TELEPHONE (OUTPATIENT)
Dept: INTERNAL MEDICINE CLINIC | Facility: CLINIC | Age: 35
End: 2020-03-10

## 2020-03-10 DIAGNOSIS — F90.0 ATTENTION DEFICIT HYPERACTIVITY DISORDER (ADHD), PREDOMINANTLY INATTENTIVE TYPE: ICD-10-CM

## 2020-03-10 RX ORDER — DEXTROAMPHETAMINE SACCHARATE, AMPHETAMINE ASPARTATE, DEXTROAMPHETAMINE SULFATE AND AMPHETAMINE SULFATE 2.5; 2.5; 2.5; 2.5 MG/1; MG/1; MG/1; MG/1
10 TABLET ORAL 2 TIMES DAILY
Qty: 60 TABLET | Refills: 0 | Status: SHIPPED | OUTPATIENT
Start: 2020-03-10 | End: 2020-04-09

## 2020-03-10 NOTE — TELEPHONE ENCOUNTER
Adderall 10 mg tabs sent to [facility] in [facility], please cancel 10 mg tab prescription for this month for [facility] in [address]

## 2020-03-10 NOTE — TELEPHONE ENCOUNTER
Hi Doctor Domnick Sacks,     Can you please resend my adderall 10mg prescription to this [facility] in [address], [place], [address]     Im normally fill it in [address]  but they dont have the brand i use and normally isnt a problem because i

## 2020-03-16 ENCOUNTER — TELEPHONE (OUTPATIENT)
Dept: INTERNAL MEDICINE CLINIC | Facility: CLINIC | Age: 35
End: 2020-03-16

## 2020-03-16 RX ORDER — OSELTAMIVIR PHOSPHATE 75 MG/1
75 CAPSULE ORAL 2 TIMES DAILY
Qty: 10 CAPSULE | Refills: 0 | Status: SHIPPED | OUTPATIENT
Start: 2020-03-16 | End: 2020-03-21

## 2020-03-16 NOTE — TELEPHONE ENCOUNTER
Will send in prescription for oseltamivir to cover for possible influenza. 1 capsule BID x 5 days. Prescription sent to [facility] in [place]. She should take tylenol for fevers/body aches as needed.

## 2020-03-16 NOTE — TELEPHONE ENCOUNTER
Pt back from [place] on Saturday. Last Friday while driving home pt developed symptoms of  fatigue, body aches and fever. Temp today 100.7. No c/o sorethroat, cough, sob or diarrhea. Pt has tried nothing OTC. Requesting advise from Dr Daniel Baker.

## 2020-03-16 NOTE — TELEPHONE ENCOUNTER
Patient called and stated that she recently traveled to [place] last Wednesday and came back on Saturday. She stated that she has a fever, sore throat and is feeling lethargic. Please call patient to triage symptoms.

## 2020-03-19 ENCOUNTER — TELEPHONE (OUTPATIENT)
Dept: INTERNAL MEDICINE CLINIC | Facility: CLINIC | Age: 35
End: 2020-03-19

## 2020-03-19 NOTE — TELEPHONE ENCOUNTER
Hi. Sorry for the trouble but can you please resend the 5mg at the [facility] in [place]. I only meant to send the 10mg to the [facility] in [place]. I didnt know they had both and they didnt tell me they had both. So i haven't picked up the 5mg yet.

## 2020-04-09 ENCOUNTER — PATIENT MESSAGE (OUTPATIENT)
Dept: INTERNAL MEDICINE CLINIC | Facility: CLINIC | Age: 35
End: 2020-04-09

## 2020-04-09 DIAGNOSIS — F90.0 ATTENTION DEFICIT HYPERACTIVITY DISORDER (ADHD), PREDOMINANTLY INATTENTIVE TYPE: ICD-10-CM

## 2020-04-09 RX ORDER — DEXTROAMPHETAMINE SACCHARATE, AMPHETAMINE ASPARTATE, DEXTROAMPHETAMINE SULFATE AND AMPHETAMINE SULFATE 2.5; 2.5; 2.5; 2.5 MG/1; MG/1; MG/1; MG/1
10 TABLET ORAL 2 TIMES DAILY
Qty: 60 TABLET | Refills: 0 | Status: SHIPPED | OUTPATIENT
Start: 2020-04-09 | End: 2020-05-06

## 2020-04-09 NOTE — TELEPHONE ENCOUNTER
From: Una Hammond  To: Monica Rush MD  Sent: 4/9/2020 10:55 AM CDT  Subject: Prescription Question    Hi Dr. Fernandez Hayes,    Can you fill my prescription of the 10 mg Adderall but at the [facility] [address] [facility] [address] again because the one in [place]

## 2020-05-05 NOTE — PROGRESS NOTES
Virtual Telephone Check-In    Jes Kidd verbally consents to a Virtual/Telephone Check-In service on 05/05/20. Patient understands and accepts financial responsibility for any deductible, co-insurance and/or co-pays associated with this service. 2-3 months. 2. Chronic abdominal pain  Patient was referred to GI - I advised patient that [address] [place] [address] is doing video visits - encouraged patient to reach out to [facility] to schedule a visit to discuss her symptoms.      Return to clinic in 3 months f

## 2020-05-05 NOTE — PATIENT INSTRUCTIONS
- Continue current Adderall dose for now. - We will re-evaluate dosing in 3 months. - Send me a [employer] message when you need refills on the Adderall.  - Follow up with the GI specialists ([address] [place] [address]) for your chronic abdominal issues.   Phone # is [phone])

## 2020-07-03 DIAGNOSIS — F90.0 ATTENTION DEFICIT HYPERACTIVITY DISORDER (ADHD), PREDOMINANTLY INATTENTIVE TYPE: ICD-10-CM

## 2020-07-06 ENCOUNTER — TELEPHONE (OUTPATIENT)
Dept: INTERNAL MEDICINE CLINIC | Facility: CLINIC | Age: 35
End: 2020-07-06

## 2020-07-06 DIAGNOSIS — F90.0 ATTENTION DEFICIT HYPERACTIVITY DISORDER (ADHD), PREDOMINANTLY INATTENTIVE TYPE: ICD-10-CM

## 2020-07-06 RX ORDER — DEXTROAMPHETAMINE SACCHARATE, AMPHETAMINE ASPARTATE, DEXTROAMPHETAMINE SULFATE AND AMPHETAMINE SULFATE 2.5; 2.5; 2.5; 2.5 MG/1; MG/1; MG/1; MG/1
10 TABLET ORAL 2 TIMES DAILY
Qty: 60 TABLET | Refills: 0 | Status: SHIPPED | OUTPATIENT
Start: 2020-07-06 | End: 2020-08-04

## 2020-07-06 RX ORDER — DEXTROAMPHETAMINE SACCHARATE, AMPHETAMINE ASPARTATE, DEXTROAMPHETAMINE SULFATE AND AMPHETAMINE SULFATE 2.5; 2.5; 2.5; 2.5 MG/1; MG/1; MG/1; MG/1
10 TABLET ORAL 2 TIMES DAILY
Qty: 60 TABLET | Refills: 0 | Status: SHIPPED | OUTPATIENT
Start: 2020-07-06 | End: 2020-07-06

## 2020-07-06 RX ORDER — DEXTROAMPHETAMINE SACCHARATE, AMPHETAMINE ASPARTATE, DEXTROAMPHETAMINE SULFATE AND AMPHETAMINE SULFATE 1.25; 1.25; 1.25; 1.25 MG/1; MG/1; MG/1; MG/1
5 TABLET ORAL DAILY
Qty: 30 TABLET | Refills: 0 | Status: SHIPPED | OUTPATIENT
Start: 2020-07-06 | End: 2020-07-21

## 2020-07-06 NOTE — TELEPHONE ENCOUNTER
No protocol     Last refill:  6/4/2020 Adderall 10 mg #60 NR  6/4/2020 Adderall 5 mg #30 NR    Last Virtual Visit - 5/5/2020 RTC 3 months  No FOV scheduled

## 2020-07-06 NOTE — TELEPHONE ENCOUNTER
10 mg Adderall prescription sent to [facility] in [place] [address]. Please call [facility] in [place] and cancel 10 mg BID Adderall prescription.

## 2020-07-06 NOTE — TELEPHONE ENCOUNTER
Prescription Question     Obie Vail MD 12 minutes ago (4:36 PM)         Hi sorry to bother you about this again.  The [facility] in [facility] have my brand again so can you cancel the 10mg order and send it to the [facility] in [facility]

## 2020-07-20 ENCOUNTER — TELEPHONE (OUTPATIENT)
Dept: INTERNAL MEDICINE CLINIC | Facility: CLINIC | Age: 35
End: 2020-07-20

## 2020-07-20 DIAGNOSIS — F90.0 ATTENTION DEFICIT HYPERACTIVITY DISORDER (ADHD), PREDOMINANTLY INATTENTIVE TYPE: ICD-10-CM

## 2020-07-20 NOTE — TELEPHONE ENCOUNTER
Im sorry i have to do this every month. The two [facility] i go to didnt have the 5mg so i had the [facility] in [facility] order it.  They just called to tell me that it's in but they don't have my prescription can you resend my 5 mg prescription to the

## 2020-07-21 RX ORDER — DEXTROAMPHETAMINE SACCHARATE, AMPHETAMINE ASPARTATE, DEXTROAMPHETAMINE SULFATE AND AMPHETAMINE SULFATE 1.25; 1.25; 1.25; 1.25 MG/1; MG/1; MG/1; MG/1
5 TABLET ORAL DAILY
Qty: 30 TABLET | Refills: 0 | Status: SHIPPED | OUTPATIENT
Start: 2020-07-21 | End: 2020-08-20

## 2020-08-03 DIAGNOSIS — F90.0 ATTENTION DEFICIT HYPERACTIVITY DISORDER (ADHD), PREDOMINANTLY INATTENTIVE TYPE: ICD-10-CM

## 2020-08-03 RX ORDER — DEXTROAMPHETAMINE SACCHARATE, AMPHETAMINE ASPARTATE, DEXTROAMPHETAMINE SULFATE AND AMPHETAMINE SULFATE 2.5; 2.5; 2.5; 2.5 MG/1; MG/1; MG/1; MG/1
10 TABLET ORAL 2 TIMES DAILY
Qty: 60 TABLET | Refills: 0 | Status: CANCELLED | OUTPATIENT
Start: 2020-08-03 | End: 2020-09-02

## 2020-08-04 ENCOUNTER — TELEPHONE (OUTPATIENT)
Dept: INTERNAL MEDICINE CLINIC | Facility: CLINIC | Age: 35
End: 2020-08-04

## 2020-08-04 ENCOUNTER — PATIENT MESSAGE (OUTPATIENT)
Dept: INTERNAL MEDICINE CLINIC | Facility: CLINIC | Age: 35
End: 2020-08-04

## 2020-08-04 DIAGNOSIS — F90.0 ATTENTION DEFICIT HYPERACTIVITY DISORDER (ADHD), PREDOMINANTLY INATTENTIVE TYPE: ICD-10-CM

## 2020-08-04 RX ORDER — DEXTROAMPHETAMINE SACCHARATE, AMPHETAMINE ASPARTATE, DEXTROAMPHETAMINE SULFATE AND AMPHETAMINE SULFATE 2.5; 2.5; 2.5; 2.5 MG/1; MG/1; MG/1; MG/1
10 TABLET ORAL 2 TIMES DAILY
Qty: 60 TABLET | Refills: 0 | Status: SHIPPED | OUTPATIENT
Start: 2020-08-04 | End: 2020-09-03

## 2020-08-04 RX ORDER — DEXTROAMPHETAMINE SACCHARATE, AMPHETAMINE ASPARTATE, DEXTROAMPHETAMINE SULFATE AND AMPHETAMINE SULFATE 2.5; 2.5; 2.5; 2.5 MG/1; MG/1; MG/1; MG/1
10 TABLET ORAL 2 TIMES DAILY
Qty: 60 TABLET | Refills: 0 | Status: CANCELLED | OUTPATIENT
Start: 2020-08-04 | End: 2020-09-03

## 2020-08-04 NOTE — TELEPHONE ENCOUNTER
Christopher Oneill MD Just now (12:10 PM)         Hi Doctor Nelsy Rocha all is well. I have to leave for [address] tomorrow morning for work can i refill my 10mg today because i wont be back in town til Monday night.

## 2020-08-18 ENCOUNTER — PATIENT MESSAGE (OUTPATIENT)
Dept: INTERNAL MEDICINE CLINIC | Facility: CLINIC | Age: 35
End: 2020-08-18

## 2020-08-19 NOTE — TELEPHONE ENCOUNTER
From: Monet Reza  To: Mariano Mcgee MD  Sent: 8/18/2020 7:36 PM CDT  Subject: Visit Follow-up Question    Hi Dr. Ronni Cardona looking to get off the adderall. I was wondering if i can make an appointment to talk about winging off it next month.  Rony Sports

## 2020-08-24 ENCOUNTER — OFFICE VISIT (OUTPATIENT)
Dept: INTERNAL MEDICINE CLINIC | Facility: CLINIC | Age: 35
End: 2020-08-24
Payer: COMMERCIAL

## 2020-08-24 ENCOUNTER — TELEPHONE (OUTPATIENT)
Dept: INTERNAL MEDICINE CLINIC | Facility: CLINIC | Age: 35
End: 2020-08-24

## 2020-08-24 VITALS
DIASTOLIC BLOOD PRESSURE: 58 MMHG | OXYGEN SATURATION: 98 % | SYSTOLIC BLOOD PRESSURE: 86 MMHG | HEIGHT: 63.25 IN | TEMPERATURE: 98 F | RESPIRATION RATE: 16 BRPM | HEART RATE: 89 BPM | WEIGHT: 108.81 LBS | BODY MASS INDEX: 19.04 KG/M2

## 2020-08-24 DIAGNOSIS — M79.672 LEFT FOOT PAIN: Primary | ICD-10-CM

## 2020-08-24 DIAGNOSIS — F90.0 ATTENTION DEFICIT HYPERACTIVITY DISORDER (ADHD), PREDOMINANTLY INATTENTIVE TYPE: ICD-10-CM

## 2020-08-24 PROCEDURE — 3008F BODY MASS INDEX DOCD: CPT | Performed by: INTERNAL MEDICINE

## 2020-08-24 PROCEDURE — 99214 OFFICE O/P EST MOD 30 MIN: CPT | Performed by: INTERNAL MEDICINE

## 2020-08-24 PROCEDURE — 3074F SYST BP LT 130 MM HG: CPT | Performed by: INTERNAL MEDICINE

## 2020-08-24 PROCEDURE — 3078F DIAST BP <80 MM HG: CPT | Performed by: INTERNAL MEDICINE

## 2020-08-24 RX ORDER — DEXTROAMPHETAMINE SACCHARATE, AMPHETAMINE ASPARTATE, DEXTROAMPHETAMINE SULFATE AND AMPHETAMINE SULFATE 1.25; 1.25; 1.25; 1.25 MG/1; MG/1; MG/1; MG/1
5 TABLET ORAL DAILY PRN
Qty: 30 TABLET | Refills: 0 | Status: SHIPPED | OUTPATIENT
Start: 2020-08-24 | End: 2020-08-24

## 2020-08-24 RX ORDER — DEXTROAMPHETAMINE SACCHARATE, AMPHETAMINE ASPARTATE, DEXTROAMPHETAMINE SULFATE AND AMPHETAMINE SULFATE 1.25; 1.25; 1.25; 1.25 MG/1; MG/1; MG/1; MG/1
5 TABLET ORAL DAILY PRN
Qty: 30 TABLET | Refills: 0 | Status: SHIPPED | OUTPATIENT
Start: 2020-08-24 | End: 2020-09-03

## 2020-08-24 NOTE — TELEPHONE ENCOUNTER
Patient calling in, asking if the RX could be sent to her Pharmacy, because if she comes into the office, she would need to bring in her son, which she does not feel comfortable doing.

## 2020-08-24 NOTE — TELEPHONE ENCOUNTER
Spoke to patient and explained Dr. Flaco Hammond does not have access to e-scribe yet and is not able to call this medication in to the pharmacy, only option is to  the RX script. Patient understands and will have , Chandlerrosangela Cantrell,  RX script.

## 2020-08-24 NOTE — PATIENT INSTRUCTIONS
It seems that your foot is healing, it should get better in the next two weeks.  If it doesn't, we can get an x ray

## 2020-08-24 NOTE — TELEPHONE ENCOUNTER
Left detailed message on patient's VM RX script (ADDERALL 5 MG) ready for  at . Left instructions to bring photo ID.

## 2020-08-24 NOTE — PROGRESS NOTES
Patient Office Visit    ASSESSMENT AND PLAN:   (R55.424) Left foot pain  (primary encounter diagnosis)  Plan: likely MSK strain/sprain. Could be a small fracture, but patient able to walk and move without difficulty.  May take 4 weeks to heal. Discussed abo her toes up   - she has slight numbness and tingling sensation at the area   where the object dropped     Chronic concerns:  Requesting refill for ADHD.  Plans on weaning off in a month    Past Medical History:   Diagnosis Date   • Allergic rhinitis    • An sinuses and no mouth issues   Eyes: . normal vision no eye pain   Respiratory: normal respirations no cough   Cardiovascular: no CP, or palpitations   Gastrointestinal: normal bowels and no abd pains   Genitourinary:  normal urination no hematuria, no freq

## 2020-09-03 NOTE — PROGRESS NOTES
Alberto Barker is a 37year old female. HPI:   Patient presents with:  Medication Follow-Up    Patient presents for follow up on several issues. She has been dealing with intermittent pain in the front of her ears. Sometimes inside ear as well.   She (08/2017); and other. Family: family history includes Cancer in an other family member; Neurological Disorder in her mother; No Known Problems in her brother, father, sister, sister, and son; possible lupus in her paternal aunt.   Social:  reports that she Dispense: 60 tablet; Refill: 0    2. Left foot pain  Dropped something on her foot. Some discomfort when curling toes, otherwise ok. Monitor for now. 3. Chronic ear pain, bilateral  Normal ear canals. Recommend trial of steroid nasal sprays.   If this

## 2020-09-03 NOTE — PATIENT INSTRUCTIONS
- Stop daily 5 mg Adderall dose  - Continue 10 mg twice daily Adderall dose for now  - Use a small amount of CBD oil to start with  - Call/message us with an update in two weeks.   Based on how you are doing we will decide on next steps for tapering down Ad

## 2020-09-29 DIAGNOSIS — F90.0 ATTENTION DEFICIT HYPERACTIVITY DISORDER (ADHD), PREDOMINANTLY INATTENTIVE TYPE: ICD-10-CM

## 2020-09-30 NOTE — TELEPHONE ENCOUNTER
No protocol     Last refill:  9/3/2020 Adderall 10 mg #60 NR    9/3/2020 Dr Osmar Guerrero RTC 3-6 months  1. Attention deficit hyperactivity disorder (ADHD), predominantly inattentive type  Doing well, however she would like to start tapering down from Adderall.

## 2020-10-01 NOTE — TELEPHONE ENCOUNTER
Can we check with patient and see if she is ready to taper down to 5 mg twice daily dose (can send her a Mychart or call her)

## 2020-10-02 RX ORDER — DEXTROAMPHETAMINE SACCHARATE, AMPHETAMINE ASPARTATE, DEXTROAMPHETAMINE SULFATE AND AMPHETAMINE SULFATE 2.5; 2.5; 2.5; 2.5 MG/1; MG/1; MG/1; MG/1
10 TABLET ORAL 2 TIMES DAILY
Qty: 60 TABLET | Refills: 0 | Status: SHIPPED | OUTPATIENT
Start: 2020-10-02 | End: 2020-11-02

## 2020-10-02 NOTE — TELEPHONE ENCOUNTER
Adderall refilled - can we send patient a [employer] message letting her know she can take 1 tablet in AM and 1/2 tablet in PM (even though prescription says 1 full tablet twice daily).

## 2020-11-27 DIAGNOSIS — F90.0 ATTENTION DEFICIT HYPERACTIVITY DISORDER (ADHD), PREDOMINANTLY INATTENTIVE TYPE: ICD-10-CM

## 2020-11-30 RX ORDER — DEXTROAMPHETAMINE SACCHARATE, AMPHETAMINE ASPARTATE, DEXTROAMPHETAMINE SULFATE AND AMPHETAMINE SULFATE 2.5; 2.5; 2.5; 2.5 MG/1; MG/1; MG/1; MG/1
10 TABLET ORAL 2 TIMES DAILY
Qty: 60 TABLET | Refills: 0 | Status: SHIPPED | OUTPATIENT
Start: 2020-12-02 | End: 2020-11-30

## 2020-11-30 NOTE — TELEPHONE ENCOUNTER
See [employer] message from 11/27/2020 - you sent her response advising rx would be sent in for when she is due on Wednesday. Pending pharmacy from pt.        Last rx was on 11/2/2020 adderall 10 mg #60 NR - ends 12/2/2020        Lov:   9/3/2020 Dr Fidelina Farr RTC

## 2020-12-02 ENCOUNTER — TELEMEDICINE (OUTPATIENT)
Dept: INTERNAL MEDICINE CLINIC | Facility: CLINIC | Age: 35
End: 2020-12-02
Payer: COMMERCIAL

## 2020-12-02 DIAGNOSIS — R42 VERTIGO: Primary | ICD-10-CM

## 2020-12-02 PROCEDURE — 99213 OFFICE O/P EST LOW 20 MIN: CPT | Performed by: INTERNAL MEDICINE

## 2020-12-02 NOTE — PROGRESS NOTES
Becca Curry is a 37year old female here today for a telemedicine/video visit via [facility]. Patient is in the state of [place] during this visit. Becca Curry verbally consents to a Video visit service on 12/02/20.   Patient understands and acc vertigo, ear discomfort. Symptoms aggravated when she turns head to the side quickly. Some associated tinnitus, ear discomfort. Some increased headaches as well. No associated nausea/emesis, visual disturbances, weakness, paresthesias.   Offered trial o

## 2021-01-20 ENCOUNTER — TELEMEDICINE (OUTPATIENT)
Dept: INTERNAL MEDICINE CLINIC | Facility: CLINIC | Age: 36
End: 2021-01-20
Payer: COMMERCIAL

## 2021-01-20 ENCOUNTER — LAB ENCOUNTER (OUTPATIENT)
Dept: LAB | Age: 36
End: 2021-01-20
Attending: INTERNAL MEDICINE
Payer: COMMERCIAL

## 2021-01-20 DIAGNOSIS — B34.9 ACUTE VIRAL SYNDROME: ICD-10-CM

## 2021-01-20 DIAGNOSIS — Z20.822 CLOSE EXPOSURE TO COVID-19 VIRUS: Primary | ICD-10-CM

## 2021-01-20 DIAGNOSIS — Z20.822 CLOSE EXPOSURE TO COVID-19 VIRUS: ICD-10-CM

## 2021-01-20 PROCEDURE — 99213 OFFICE O/P EST LOW 20 MIN: CPT | Performed by: INTERNAL MEDICINE

## 2021-01-20 NOTE — PROGRESS NOTES
Monet Reza is a 37year old female here today for a telemedicine/video visit. Patient is in the state of [place] during this visit. Monet Reza verbally consents to a Video visit service on 01/20/21.   Patient understands and accepts financia developing symptoms yesterday 1/19/21 - headaches, subjective fevers, body aches, fatigue, throat pain, chest pressure. Will check COVID testing. Recommend PRN acetaminophen. Isolate at least until results are back.   Advised patient to go to emergency d

## 2021-01-21 LAB — SARS-COV-2 RNA RESP QL NAA+PROBE: NOT DETECTED

## 2021-01-27 DIAGNOSIS — F90.0 ATTENTION DEFICIT HYPERACTIVITY DISORDER (ADHD), PREDOMINANTLY INATTENTIVE TYPE: ICD-10-CM

## 2021-01-28 RX ORDER — DEXTROAMPHETAMINE SACCHARATE, AMPHETAMINE ASPARTATE, DEXTROAMPHETAMINE SULFATE AND AMPHETAMINE SULFATE 1.25; 1.25; 1.25; 1.25 MG/1; MG/1; MG/1; MG/1
5 TABLET ORAL DAILY
Qty: 30 TABLET | Refills: 0 | Status: SHIPPED | OUTPATIENT
Start: 2021-01-28 | End: 2021-03-01

## 2021-01-28 NOTE — TELEPHONE ENCOUNTER
Failed protocol     Last refill:   12/21/2020 Adderall 5 mg #30 NR      LOV:   1/20/21 Dr Bree Gaytan RTC 3 months  No FOV scheduled

## 2021-02-05 ENCOUNTER — PATIENT MESSAGE (OUTPATIENT)
Dept: INTERNAL MEDICINE CLINIC | Facility: CLINIC | Age: 36
End: 2021-02-05

## 2021-02-05 DIAGNOSIS — F90.0 ATTENTION DEFICIT HYPERACTIVITY DISORDER (ADHD), PREDOMINANTLY INATTENTIVE TYPE: ICD-10-CM

## 2021-02-05 NOTE — TELEPHONE ENCOUNTER
From: Becca Curry  To: Nicole Valerio MD  Sent: 2/5/2021 1:50 PM CST  Subject: Prescription Question    Hi Dr. Tamika Renteria,  Can you send the 10mg of Adderall to the [facility] pharmacy in [address]?  I was just going to request the refill but it didn't give

## 2021-02-07 RX ORDER — DEXTROAMPHETAMINE SACCHARATE, AMPHETAMINE ASPARTATE, DEXTROAMPHETAMINE SULFATE AND AMPHETAMINE SULFATE 2.5; 2.5; 2.5; 2.5 MG/1; MG/1; MG/1; MG/1
10 TABLET ORAL 2 TIMES DAILY
Qty: 60 TABLET | Refills: 0 | Status: SHIPPED | OUTPATIENT
Start: 2021-02-07 | End: 2021-03-01

## 2021-03-01 DIAGNOSIS — F90.0 ATTENTION DEFICIT HYPERACTIVITY DISORDER (ADHD), PREDOMINANTLY INATTENTIVE TYPE: ICD-10-CM

## 2021-03-02 RX ORDER — DEXTROAMPHETAMINE SACCHARATE, AMPHETAMINE ASPARTATE, DEXTROAMPHETAMINE SULFATE AND AMPHETAMINE SULFATE 1.25; 1.25; 1.25; 1.25 MG/1; MG/1; MG/1; MG/1
5 TABLET ORAL DAILY
Qty: 30 TABLET | Refills: 0 | Status: SHIPPED | OUTPATIENT
Start: 2021-03-02 | End: 2021-04-05

## 2021-03-02 RX ORDER — DEXTROAMPHETAMINE SACCHARATE, AMPHETAMINE ASPARTATE, DEXTROAMPHETAMINE SULFATE AND AMPHETAMINE SULFATE 2.5; 2.5; 2.5; 2.5 MG/1; MG/1; MG/1; MG/1
10 TABLET ORAL 2 TIMES DAILY
Qty: 60 TABLET | Refills: 0 | Status: SHIPPED | OUTPATIENT
Start: 2021-03-10 | End: 2021-03-05

## 2021-03-02 NOTE — TELEPHONE ENCOUNTER
Medication(s) to Refill:   Requested Prescriptions     Pending Prescriptions Disp Refills   • amphetamine-dextroamphetamine 5 MG Oral Tab 30 tablet 0     Sig: Take 1 tablet (5 mg total) by mouth daily. Take in afternoon with 10 mg tab.   Sandoz brand only,

## 2021-05-06 DIAGNOSIS — F90.0 ATTENTION DEFICIT HYPERACTIVITY DISORDER (ADHD), PREDOMINANTLY INATTENTIVE TYPE: ICD-10-CM

## 2021-05-07 RX ORDER — DEXTROAMPHETAMINE SACCHARATE, AMPHETAMINE ASPARTATE, DEXTROAMPHETAMINE SULFATE AND AMPHETAMINE SULFATE 1.25; 1.25; 1.25; 1.25 MG/1; MG/1; MG/1; MG/1
TABLET ORAL
Qty: 30 TABLET | Refills: 0 | OUTPATIENT
Start: 2021-05-07

## 2021-05-07 RX ORDER — DEXTROAMPHETAMINE SACCHARATE, AMPHETAMINE ASPARTATE, DEXTROAMPHETAMINE SULFATE AND AMPHETAMINE SULFATE 2.5; 2.5; 2.5; 2.5 MG/1; MG/1; MG/1; MG/1
TABLET ORAL
Qty: 60 TABLET | Refills: 0 | OUTPATIENT
Start: 2021-05-07

## 2021-06-03 ENCOUNTER — PATIENT MESSAGE (OUTPATIENT)
Dept: INTERNAL MEDICINE CLINIC | Facility: CLINIC | Age: 36
End: 2021-06-03

## 2021-06-03 DIAGNOSIS — F90.0 ATTENTION DEFICIT HYPERACTIVITY DISORDER (ADHD), PREDOMINANTLY INATTENTIVE TYPE: ICD-10-CM

## 2021-06-04 RX ORDER — DEXTROAMPHETAMINE SACCHARATE, AMPHETAMINE ASPARTATE, DEXTROAMPHETAMINE SULFATE AND AMPHETAMINE SULFATE 2.5; 2.5; 2.5; 2.5 MG/1; MG/1; MG/1; MG/1
10 TABLET ORAL 2 TIMES DAILY
Qty: 60 TABLET | Refills: 0 | Status: SHIPPED | OUTPATIENT
Start: 2021-06-04 | End: 2021-07-04

## 2021-06-04 RX ORDER — DEXTROAMPHETAMINE SACCHARATE, AMPHETAMINE ASPARTATE, DEXTROAMPHETAMINE SULFATE AND AMPHETAMINE SULFATE 1.25; 1.25; 1.25; 1.25 MG/1; MG/1; MG/1; MG/1
5 TABLET ORAL DAILY
Qty: 30 TABLET | Refills: 0 | Status: SHIPPED | OUTPATIENT
Start: 2021-06-04 | End: 2021-07-04

## 2021-06-04 NOTE — TELEPHONE ENCOUNTER
From: Daniel Laurent  To: Veronica Mcrae MD  Sent: 6/3/2021 5:02 PM CDT  Subject: Prescription Question    Hi Dr Kathryn Conrad. I sent in an request for my medication refill.  Was wondering if you can send that in to [facility] [place] but date it for the 6th to r

## 2021-06-04 NOTE — TELEPHONE ENCOUNTER
amphetamine-dextroamphetamine 10 MG Oral Tab          Sig: Take 1 tablet (10 mg total) by mouth 2 (two) times daily. Sandoz only, patient has side effects with Teva generic.     Disp:  60 tablet    Refills:  0    Start: 6/3/2021 - 7/3/2021    Earliest Fill

## 2021-06-30 NOTE — TELEPHONE ENCOUNTER
From: Chau Caicedo  To: Clarisse Carmichael MD  Sent: 6/30/2021 4:51 PM CDT  Subject: Prescription Question    Hi Dr. Tequila Anderson. So I feel I'm having issues with my medication. The last 2 months I been feeling sick on them.  Last month I thought it was a bad

## 2021-07-06 NOTE — TELEPHONE ENCOUNTER
Can we check with her pharmacy about prior auth for brand name Adderall? Not sure if it has been faxed to us yet. Patient having side effects with multiple generic versions of Adderall.   Patient's [employer] message pasted below:    Master Plummer Ask

## 2021-07-08 NOTE — TELEPHONE ENCOUNTER
Pt called. States she called [facility] in [place] and cost of brand name Adderall 5 mg is about $75 with [place]. Pt states would like to go back to generic Sandoz brand and she will pick it up from [address] [place] [address] in [place] [address].  If side effects continue she will sched

## 2021-07-08 NOTE — TELEPHONE ENCOUNTER
Approvedon July 7   Request Reference Number: [id number]. ADDERALL TAB 5MG is approved through 07/07/2022. Your patient may now fill this prescription and it will be covered.     **10 mg still pending

## 2021-07-30 ENCOUNTER — PATIENT MESSAGE (OUTPATIENT)
Dept: INTERNAL MEDICINE CLINIC | Facility: CLINIC | Age: 36
End: 2021-07-30

## 2021-08-02 ENCOUNTER — TELEPHONE (OUTPATIENT)
Dept: INTERNAL MEDICINE CLINIC | Facility: CLINIC | Age: 36
End: 2021-08-02

## 2021-08-02 NOTE — TELEPHONE ENCOUNTER
From: [facility]  To: Morgan Ramirez MD  Sent: 7/30/2021 5:15 PM CDT  Subject: Prescription Question    Hi Dr. Raffy Rocha  Would I be able to fill my prescription next Tuesday the 3rd?  I leave for [place] on the 4th for work and important meetings with the

## 2021-08-02 NOTE — TELEPHONE ENCOUNTER
[facility]onia Bledsoe was contacted to release Adderall 5 mg early. Pharmacist stated Juan R Saini is not covered by insurance. Yanna Wright for generic? Or [place]?

## 2021-08-02 NOTE — TELEPHONE ENCOUNTER
LMTCB on pt's voicemail. Spoke with the pharmacist at [facility] brand will need to be ordered and it may take up to 3 days to receive.

## 2021-08-02 NOTE — TELEPHONE ENCOUNTER
Generic should be ok, as long as they have Sandoz brand, I believe patient has issues with other generic brands

## 2021-08-03 ENCOUNTER — PATIENT MESSAGE (OUTPATIENT)
Dept: INTERNAL MEDICINE CLINIC | Facility: CLINIC | Age: 36
End: 2021-08-03

## 2021-08-03 DIAGNOSIS — F90.0 ATTENTION DEFICIT HYPERACTIVITY DISORDER (ADHD), PREDOMINANTLY INATTENTIVE TYPE: ICD-10-CM

## 2021-08-03 NOTE — TELEPHONE ENCOUNTER
From: Adri Wells  To: Edith Clinton MD  Sent: 8/3/2021 2:11 PM CDT  Subject: Prescription Question    Hi. I'm sorry to be a pain but [facility] in [place] has the 10mg to be filled today.  Can you cancel that and just put in for the 5mg and I'll have

## 2021-08-03 NOTE — TELEPHONE ENCOUNTER
I spoke with Autumn Pittman, pharmacist from [facility] in [place] and requested for adderall 5 mg north star brand to be dispensed. Per Autumn Pittman he will prepare RX which was sent on 7/4/21 for pt to  of adderall 5 mg-north star brand. Pt aware.

## 2021-08-03 NOTE — TELEPHONE ENCOUNTER
[address] [address] Helton for pharmacy to dispense Robinson Lebron brand as the [employer] is not in stock?

## 2021-08-10 ENCOUNTER — PATIENT MESSAGE (OUTPATIENT)
Dept: INTERNAL MEDICINE CLINIC | Facility: CLINIC | Age: 36
End: 2021-08-10

## 2021-08-10 NOTE — TELEPHONE ENCOUNTER
From: Krystal Anguiano  To: Stanley Huang MD  Sent: 8/10/2021 4:11 PM CDT  Subject: Prescription Question    Hi Dr. Dorys Salazar,  Can you change my medication  to the [facility] [place] instead of the [facility] in [place].  The [place] location i

## 2021-08-11 RX ORDER — DEXTROAMPHETAMINE SACCHARATE, AMPHETAMINE ASPARTATE, DEXTROAMPHETAMINE SULFATE AND AMPHETAMINE SULFATE 2.5; 2.5; 2.5; 2.5 MG/1; MG/1; MG/1; MG/1
TABLET ORAL
Qty: 60 TABLET | Refills: 0 | Status: SHIPPED | OUTPATIENT
Start: 2021-08-11 | End: 2021-12-07

## 2021-08-11 NOTE — TELEPHONE ENCOUNTER
AMPHETAMINE SALT COMBO 07/08/2021 07/08/2021 5 mg 30  30 [facility], [facility]   DEXTROAMPHETAMINE 07/08/2021 07/08/2021 10 mg 60 tablet  30 [facility], [address] [facility]

## 2021-08-20 ENCOUNTER — OFFICE VISIT (OUTPATIENT)
Dept: INTERNAL MEDICINE CLINIC | Facility: CLINIC | Age: 36
End: 2021-08-20
Payer: COMMERCIAL

## 2021-08-20 ENCOUNTER — TELEPHONE (OUTPATIENT)
Dept: INTERNAL MEDICINE CLINIC | Facility: CLINIC | Age: 36
End: 2021-08-20

## 2021-08-20 VITALS
HEIGHT: 63 IN | BODY MASS INDEX: 19.63 KG/M2 | TEMPERATURE: 98 F | OXYGEN SATURATION: 96 % | WEIGHT: 110.81 LBS | HEART RATE: 109 BPM | SYSTOLIC BLOOD PRESSURE: 99 MMHG | RESPIRATION RATE: 16 BRPM | DIASTOLIC BLOOD PRESSURE: 69 MMHG

## 2021-08-20 DIAGNOSIS — F90.0 ATTENTION DEFICIT HYPERACTIVITY DISORDER (ADHD), PREDOMINANTLY INATTENTIVE TYPE: ICD-10-CM

## 2021-08-20 DIAGNOSIS — H04.123 DRY EYES: ICD-10-CM

## 2021-08-20 DIAGNOSIS — Z00.00 ENCOUNTER FOR PREVENTATIVE ADULT HEALTH CARE EXAMINATION: Primary | ICD-10-CM

## 2021-08-20 PROCEDURE — 99395 PREV VISIT EST AGE 18-39: CPT | Performed by: INTERNAL MEDICINE

## 2021-08-20 PROCEDURE — 3008F BODY MASS INDEX DOCD: CPT | Performed by: INTERNAL MEDICINE

## 2021-08-20 PROCEDURE — 3078F DIAST BP <80 MM HG: CPT | Performed by: INTERNAL MEDICINE

## 2021-08-20 PROCEDURE — 3074F SYST BP LT 130 MM HG: CPT | Performed by: INTERNAL MEDICINE

## 2021-08-20 RX ORDER — DEXTROAMPHETAMINE SACCHARATE, AMPHETAMINE ASPARTATE, DEXTROAMPHETAMINE SULFATE AND AMPHETAMINE SULFATE 1.25; 1.25; 1.25; 1.25 MG/1; MG/1; MG/1; MG/1
5 TABLET ORAL 2 TIMES DAILY
Qty: 60 TABLET | Refills: 0 | Status: SHIPPED | OUTPATIENT
Start: 2021-10-21 | End: 2021-11-22

## 2021-08-20 RX ORDER — DEXTROAMPHETAMINE SACCHARATE, AMPHETAMINE ASPARTATE, DEXTROAMPHETAMINE SULFATE AND AMPHETAMINE SULFATE 2.5; 2.5; 2.5; 2.5 MG/1; MG/1; MG/1; MG/1
10 TABLET ORAL 2 TIMES DAILY
Qty: 60 TABLET | Refills: 0 | Status: SHIPPED | OUTPATIENT
Start: 2021-09-10 | End: 2021-10-10

## 2021-08-20 RX ORDER — DEXTROAMPHETAMINE SACCHARATE, AMPHETAMINE ASPARTATE, DEXTROAMPHETAMINE SULFATE AND AMPHETAMINE SULFATE 2.5; 2.5; 2.5; 2.5 MG/1; MG/1; MG/1; MG/1
10 TABLET ORAL 2 TIMES DAILY
Qty: 60 TABLET | Refills: 0 | Status: SHIPPED | OUTPATIENT
Start: 2021-10-10 | End: 2021-11-09

## 2021-08-20 RX ORDER — DEXTROAMPHETAMINE SACCHARATE, AMPHETAMINE ASPARTATE, DEXTROAMPHETAMINE SULFATE AND AMPHETAMINE SULFATE 2.5; 2.5; 2.5; 2.5 MG/1; MG/1; MG/1; MG/1
10 TABLET ORAL 2 TIMES DAILY
Qty: 60 TABLET | Refills: 0 | Status: SHIPPED | OUTPATIENT
Start: 2021-11-09 | End: 2021-12-07

## 2021-08-20 RX ORDER — DEXTROAMPHETAMINE SACCHARATE, AMPHETAMINE ASPARTATE, DEXTROAMPHETAMINE SULFATE AND AMPHETAMINE SULFATE 1.25; 1.25; 1.25; 1.25 MG/1; MG/1; MG/1; MG/1
5 TABLET ORAL 2 TIMES DAILY
Qty: 60 TABLET | Refills: 0 | Status: SHIPPED | OUTPATIENT
Start: 2021-08-20 | End: 2021-09-19

## 2021-08-20 RX ORDER — DEXTROAMPHETAMINE SACCHARATE, AMPHETAMINE ASPARTATE, DEXTROAMPHETAMINE SULFATE AND AMPHETAMINE SULFATE 1.25; 1.25; 1.25; 1.25 MG/1; MG/1; MG/1; MG/1
5 TABLET ORAL 2 TIMES DAILY
Qty: 60 TABLET | Refills: 0 | Status: SHIPPED | OUTPATIENT
Start: 2021-09-20 | End: 2021-10-20

## 2021-08-20 NOTE — PATIENT INSTRUCTIONS
- Will increase Adderall dose to 15 mg twice daily (10 and 5 mg tabs twice daily) for total daily dose of 30 mg  - Get blood work done when fasting (at least 8 hours, water and medications only)  - Check with your local [address] [place] [address] to see if the

## 2021-08-20 NOTE — PROGRESS NOTES
Yesenia William is a 31year old female. HPI:   Patient presents with:  Physical: Moving out of state on 8/31/2021; Will not have insurance;  Medication Follow-Up: Adderall 25mg; Lack of concetration    Patient presents for CPX/wellness examination.   Richad Sheets (10 mg total) by mouth 2 (two) times daily. , Disp: 60 tablet, Rfl: 0  •  [START ON 11/9/2021] amphetamine-dextroamphetamine (ADDERALL) 10 MG Oral Tab, Take 1 tablet (10 mg total) by mouth 2 (two) times daily. , Disp: 60 tablet, Rfl: 0  •  amphetamine-dextro apparent distress  SKIN: no rashes,no suspicious lesions  HEENT: atraumatic, PERRLA, EOMI, normal lid and conjunctiva, normal external canals and tympanic membranes bilaterally  NECK: supple, no jvd, no thyromegaly, no palpable/tender cervical lymphadenopa amphetamine-dextroamphetamine (ADDERALL) 10 MG Oral Tab; Take 1 tablet (10 mg total) by mouth 2 (two) times daily. Dispense: 60 tablet; Refill: 0  - amphetamine-dextroamphetamine (ADDERALL) 10 MG Oral Tab;  Take 1 tablet (10 mg total) by mouth 2 (two) time

## 2021-08-27 ENCOUNTER — LAB ENCOUNTER (OUTPATIENT)
Dept: LAB | Age: 36
End: 2021-08-27
Attending: INTERNAL MEDICINE
Payer: COMMERCIAL

## 2021-08-27 DIAGNOSIS — H04.123 DRY EYES: ICD-10-CM

## 2021-08-27 DIAGNOSIS — Z00.00 ENCOUNTER FOR PREVENTATIVE ADULT HEALTH CARE EXAMINATION: ICD-10-CM

## 2021-08-27 LAB
ALBUMIN SERPL-MCNC: 4.2 G/DL (ref 3.4–5)
ALBUMIN/GLOB SERPL: 1.1 {RATIO} (ref 1–2)
ALP LIVER SERPL-CCNC: 67 U/L
ALT SERPL-CCNC: 9 U/L
ANION GAP SERPL CALC-SCNC: 6 MMOL/L (ref 0–18)
AST SERPL-CCNC: 9 U/L (ref 15–37)
BASOPHILS # BLD AUTO: 0.04 X10(3) UL (ref 0–0.2)
BASOPHILS NFR BLD AUTO: 0.7 %
BILIRUB SERPL-MCNC: 0.7 MG/DL (ref 0.1–2)
BUN BLD-MCNC: 9 MG/DL (ref 7–18)
CALCIUM BLD-MCNC: 9.3 MG/DL (ref 8.5–10.1)
CHLORIDE SERPL-SCNC: 106 MMOL/L (ref 98–112)
CHOLEST SMN-MCNC: 140 MG/DL (ref ?–200)
CO2 SERPL-SCNC: 26 MMOL/L (ref 21–32)
CREAT BLD-MCNC: 0.81 MG/DL
EOSINOPHIL # BLD AUTO: 0.12 X10(3) UL (ref 0–0.7)
EOSINOPHIL NFR BLD AUTO: 2.2 %
ERYTHROCYTE [DISTWIDTH] IN BLOOD BY AUTOMATED COUNT: 11.9 %
EST. AVERAGE GLUCOSE BLD GHB EST-MCNC: 117 MG/DL (ref 68–126)
GLOBULIN PLAS-MCNC: 3.7 G/DL (ref 2.8–4.4)
GLUCOSE BLD-MCNC: 89 MG/DL (ref 70–99)
HBA1C MFR BLD HPLC: 5.7 % (ref ?–5.7)
HCT VFR BLD AUTO: 45 %
HDLC SERPL-MCNC: 66 MG/DL (ref 40–59)
HGB BLD-MCNC: 14.2 G/DL
IMM GRANULOCYTES # BLD AUTO: 0.01 X10(3) UL (ref 0–1)
IMM GRANULOCYTES NFR BLD: 0.2 %
LDLC SERPL CALC-MCNC: 62 MG/DL (ref ?–100)
LYMPHOCYTES # BLD AUTO: 2.08 X10(3) UL (ref 1–4)
LYMPHOCYTES NFR BLD AUTO: 39 %
M PROTEIN MFR SERPL ELPH: 7.9 G/DL (ref 6.4–8.2)
MCH RBC QN AUTO: 31.1 PG (ref 26–34)
MCHC RBC AUTO-ENTMCNC: 31.6 G/DL (ref 31–37)
MCV RBC AUTO: 98.7 FL
MONOCYTES # BLD AUTO: 0.38 X10(3) UL (ref 0.1–1)
MONOCYTES NFR BLD AUTO: 7.1 %
NEUTROPHILS # BLD AUTO: 2.71 X10 (3) UL (ref 1.5–7.7)
NEUTROPHILS # BLD AUTO: 2.71 X10(3) UL (ref 1.5–7.7)
NEUTROPHILS NFR BLD AUTO: 50.8 %
NONHDLC SERPL-MCNC: 74 MG/DL (ref ?–130)
OSMOLALITY SERPL CALC.SUM OF ELEC: 284 MOSM/KG (ref 275–295)
PATIENT FASTING Y/N/NP: YES
PATIENT FASTING Y/N/NP: YES
PLATELET # BLD AUTO: 196 10(3)UL (ref 150–450)
POTASSIUM SERPL-SCNC: 5 MMOL/L (ref 3.5–5.1)
RBC # BLD AUTO: 4.56 X10(6)UL
SODIUM SERPL-SCNC: 138 MMOL/L (ref 136–145)
TRIGL SERPL-MCNC: 53 MG/DL (ref 30–149)
TSI SER-ACNC: 1.1 MIU/ML (ref 0.36–3.74)
VLDLC SERPL CALC-MCNC: 8 MG/DL (ref 0–30)
WBC # BLD AUTO: 5.3 X10(3) UL (ref 4–11)

## 2021-08-27 PROCEDURE — 36415 COLL VENOUS BLD VENIPUNCTURE: CPT

## 2021-08-27 PROCEDURE — 83036 HEMOGLOBIN GLYCOSYLATED A1C: CPT

## 2021-08-27 PROCEDURE — 80053 COMPREHEN METABOLIC PANEL: CPT

## 2021-08-27 PROCEDURE — 85025 COMPLETE CBC W/AUTO DIFF WBC: CPT

## 2021-08-27 PROCEDURE — 86038 ANTINUCLEAR ANTIBODIES: CPT

## 2021-08-27 PROCEDURE — 84443 ASSAY THYROID STIM HORMONE: CPT

## 2021-08-27 PROCEDURE — 80061 LIPID PANEL: CPT

## 2021-08-30 LAB — ANA SCREEN: NEGATIVE

## 2021-12-21 NOTE — PROGRESS NOTES
Subjective:   Gilberto New is a 60 [address] old female who presents for Medication Follow-Up (Pt here for medication follow on Adderall.  She moved to [place] but has not been able to find PCP.  ) and Hand Pain (Pt c/o b/l hand pain, numbness and disc exchange; CTAB; no crackles or wheezing   Cardiovascular: RRR; S1, S2; no murmurs; 2+ radial pulses; good capillary refill  Neurological: awake, alert, oriented x3; CNII-XII grossly intact;   MSK: full ROM; strength 5/5  Behavioral/Psych: euthymic; appropr

## 2022-01-24 DIAGNOSIS — F90.0 ATTENTION DEFICIT HYPERACTIVITY DISORDER (ADHD), PREDOMINANTLY INATTENTIVE TYPE: ICD-10-CM

## 2022-01-24 RX ORDER — DEXTROAMPHETAMINE SACCHARATE, AMPHETAMINE ASPARTATE, DEXTROAMPHETAMINE SULFATE AND AMPHETAMINE SULFATE 2.5; 2.5; 2.5; 2.5 MG/1; MG/1; MG/1; MG/1
10 TABLET ORAL 2 TIMES DAILY
Qty: 60 TABLET | Refills: 0 | OUTPATIENT
Start: 2022-01-24 | End: 2022-02-23

## 2022-01-24 NOTE — TELEPHONE ENCOUNTER
LOV: 12/21/2021 with Dr. Robin Lemons   RTC: no follow-up on file  Last Relevant Labs: 8/27/2021  Filled: 1/6/2022    #60 with 0 refills    No future appointments.

## 2022-01-27 RX ORDER — DEXTROAMPHETAMINE SACCHARATE, AMPHETAMINE ASPARTATE, DEXTROAMPHETAMINE SULFATE AND AMPHETAMINE SULFATE 1.25; 1.25; 1.25; 1.25 MG/1; MG/1; MG/1; MG/1
5 TABLET ORAL 2 TIMES DAILY
Qty: 60 TABLET | Refills: 0 | Status: SHIPPED | OUTPATIENT
Start: 2022-01-27

## 2022-01-27 NOTE — TELEPHONE ENCOUNTER
Would you be ok with providing refill on 5 mg for pt? Last refill:  amphetamine-dextroamphetamine (ADDERALL) 5 MG Oral Tab () 60 tablet 0 2021   Sig:   Take 1 tablet (5 mg total) by mouth 2 (two) times daily.

## 2022-02-07 RX ORDER — DEXTROAMPHETAMINE SACCHARATE, AMPHETAMINE ASPARTATE, DEXTROAMPHETAMINE SULFATE AND AMPHETAMINE SULFATE 1.25; 1.25; 1.25; 1.25 MG/1; MG/1; MG/1; MG/1
5 TABLET ORAL 2 TIMES DAILY
Qty: 60 TABLET | Refills: 0 | Status: CANCELLED | OUTPATIENT
Start: 2022-02-07

## 2022-02-07 RX ORDER — DEXTROAMPHETAMINE SACCHARATE, AMPHETAMINE ASPARTATE, DEXTROAMPHETAMINE SULFATE AND AMPHETAMINE SULFATE 2.5; 2.5; 2.5; 2.5 MG/1; MG/1; MG/1; MG/1
10 TABLET ORAL 2 TIMES DAILY
Qty: 60 TABLET | Refills: 0 | Status: SHIPPED | OUTPATIENT
Start: 2022-02-07

## 2023-02-14 PROBLEM — R73.03 PREDIABETES: Status: ACTIVE | Noted: 2022-02-11

## 2023-02-14 PROBLEM — I73.00 RAYNAUD'S DISEASE WITHOUT GANGRENE: Status: ACTIVE | Noted: 2022-02-11

## 2023-02-14 PROBLEM — R42 VERTIGO: Status: ACTIVE | Noted: 2022-02-11

## 2023-02-14 PROBLEM — F32.4 MAJOR DEPRESSIVE DISORDER IN PARTIAL REMISSION (HCC): Status: ACTIVE | Noted: 2022-02-11

## 2023-02-14 PROBLEM — F41.9 ANXIETY: Status: ACTIVE | Noted: 2022-02-11

## 2023-02-14 PROBLEM — R76.8 POSITIVE ANA (ANTINUCLEAR ANTIBODY): Status: ACTIVE | Noted: 2022-02-11

## 2023-02-14 PROBLEM — F90.2 ADHD (ATTENTION DEFICIT HYPERACTIVITY DISORDER), COMBINED TYPE: Status: ACTIVE | Noted: 2019-04-12

## 2023-02-14 PROBLEM — F90.2 ADHD (ATTENTION DEFICIT HYPERACTIVITY DISORDER), COMBINED TYPE: Chronic | Status: ACTIVE | Noted: 2019-04-12

## 2023-02-14 PROBLEM — M25.50 PAIN IN JOINT: Status: ACTIVE | Noted: 2022-02-11

## 2023-05-15 ENCOUNTER — PATIENT MESSAGE (OUTPATIENT)
Dept: INTERNAL MEDICINE CLINIC | Facility: CLINIC | Age: 38
End: 2023-05-15

## 2023-05-15 DIAGNOSIS — F90.2 ADHD (ATTENTION DEFICIT HYPERACTIVITY DISORDER), COMBINED TYPE: Chronic | ICD-10-CM
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
## 2023-06-15 DIAGNOSIS — F90.2 ADHD (ATTENTION DEFICIT HYPERACTIVITY DISORDER), COMBINED TYPE: Chronic | ICD-10-CM

## 2023-07-11 ENCOUNTER — PATIENT MESSAGE (OUTPATIENT)
Dept: INTERNAL MEDICINE CLINIC | Facility: CLINIC | Age: 38
End: 2023-07-11

## 2023-07-11 NOTE — TELEPHONE ENCOUNTER
From: Chaya Santos  To: Christiano Carbajal MD  Sent: 7/11/2023 11:45 AM CDT  Subject: Medication     Hi Dr. Chari Escobar been on vyvanse for a few months now and although I do feel it's working for my concentration, I also feel it's also rising my anxiety and I feel it's lowered my patience which I don't like due to my son being autistic, my patience is very important. I'm normally very patient but ever since I started vyvanse I feel that my patience is non-existent. So I would prefer to go back to the adderall so that I can go back to having the patience that's needed for my son.      Thank you

## 2023-07-11 NOTE — TELEPHONE ENCOUNTER
LOV:  02/14/2023 Dr Francisca Vargas [facility] 3-6 months   1. ADHD (attention deficit hyperactivity disorder), combined type  Patient did see a psychiatrist in [place]. Now on Adderall IR 20 mg BID. In the past she has had side effects with Teva brand of generic Adderall but did tolerate it once in [place]. Will continue Adderall for now. Information provided for local psychiatrists as well, though she will likely be between insurances for 1-2 months. - amphetamine-dextroamphetamine (ADDERALL) 20 MG Oral Tab; Take 1 tablet (20 mg total) by mouth 2 (two) times daily. Dispense: 60 tablet; Refill: 0  - amphetamine-dextroamphetamine (ADDERALL) 20 MG Oral Tab; Take 1 tablet (20 mg total) by mouth 2 (two) times daily. Dispense: 60 tablet; Refill: 0  - amphetamine-dextroamphetamine (ADDERALL) 20 MG Oral Tab; Take 1 tablet (20 mg total) by mouth 2 (two) times daily. Dispense: 60 tablet;  Refill: 0

## 2023-09-11 ENCOUNTER — PATIENT MESSAGE (OUTPATIENT)
Dept: INTERNAL MEDICINE CLINIC | Facility: CLINIC | Age: 38
End: 2023-09-11

## 2023-09-11 DIAGNOSIS — F90.2 ADHD (ATTENTION DEFICIT HYPERACTIVITY DISORDER), COMBINED TYPE: Chronic | ICD-10-CM

## 2023-09-11 RX ORDER — DEXTROAMPHETAMINE SACCHARATE, AMPHETAMINE ASPARTATE, DEXTROAMPHETAMINE SULFATE AND AMPHETAMINE SULFATE 5; 5; 5; 5 MG/1; MG/1; MG/1; MG/1
20 TABLET ORAL 2 TIMES DAILY
Qty: 60 TABLET | Refills: 0 | Status: SHIPPED | OUTPATIENT
Start: 2023-09-13 | End: 2023-09-11

## 2023-09-11 RX ORDER — DEXTROAMPHETAMINE SACCHARATE, AMPHETAMINE ASPARTATE, DEXTROAMPHETAMINE SULFATE AND AMPHETAMINE SULFATE 5; 5; 5; 5 MG/1; MG/1; MG/1; MG/1
20 TABLET ORAL 2 TIMES DAILY
Qty: 60 TABLET | Refills: 0 | Status: SHIPPED | OUTPATIENT
Start: 2023-09-11 | End: 2023-10-11

## 2023-09-11 NOTE — TELEPHONE ENCOUNTER
Rx from 09/13 sent to [facility]. Requesting rx to be sent to [address] [place] [address],[address], pended. Last refill:    Dispensed Written Strength Quantity Refills Days Supply Provider Pharmacy    AMPHET/DEXTR 20 MG TAB NORT 08/12/2023 08/10/2023  60 each  27 Linh Díaz MD [address] [place] [address] #[id number] - BOL[place]. ..

## 2023-09-11 NOTE — TELEPHONE ENCOUNTER
From: Yessi Crowder  To: Tasha Verdin MD  Sent: 9/11/2023 9:56 AM CDT  Subject: Prescription refill    Hi Dr. Topher Perez,  I'm due for a refill but the [facility] pharmacy that I typically go to does not have any in stock and is still unsure when they will be getting any in stock. However the [facility] pharmacy in [address] [place] [address],[address] does currently have my medication in stock. Can you please transfer my medication refill to the [facility] in [place]. Thank you so much!    [address], [address] [place] [address],[address], [address] [place] [address]  [phone]

## 2023-09-11 NOTE — TELEPHONE ENCOUNTER
Patient would like to know why it was sent starting 09/13/2023. She filled prescription 08/12/2023 would be short as August has 31 days. Can we please correct this?

## 2023-10-09 DIAGNOSIS — F90.2 ADHD (ATTENTION DEFICIT HYPERACTIVITY DISORDER), COMBINED TYPE: Chronic | ICD-10-CM

## 2023-10-09 NOTE — TELEPHONE ENCOUNTER
Patient called requesting refill on   amphetamine-dextroamphetamine (ADDERALL) 20 MG Oral Tab    And that she needs it sent to [facility] [address] [place] [phone], [phone] [[id number]]    Please advise.

## 2023-10-10 RX ORDER — DEXTROAMPHETAMINE SACCHARATE, AMPHETAMINE ASPARTATE, DEXTROAMPHETAMINE SULFATE AND AMPHETAMINE SULFATE 5; 5; 5; 5 MG/1; MG/1; MG/1; MG/1
20 TABLET ORAL 2 TIMES DAILY
Qty: 60 TABLET | Refills: 0 | Status: SHIPPED | OUTPATIENT
Start: 2023-10-10 | End: 2023-11-09

## 2023-10-10 NOTE — TELEPHONE ENCOUNTER
Requested Prescriptions     Pending Prescriptions Disp Refills    amphetamine-dextroamphetamine (ADDERALL) 20 MG Oral Tab 60 tablet 0     Sig: Take 1 tablet (20 mg total) by mouth 2 (two) times daily. No protocol     LOV 7/13/23  RTC 3-6 months  Filled 9/11/23 60 tabs 0 refills   No future appointments.

## 2023-11-07 DIAGNOSIS — F90.2 ADHD (ATTENTION DEFICIT HYPERACTIVITY DISORDER), COMBINED TYPE: Chronic | ICD-10-CM

## 2023-11-07 RX ORDER — DEXTROAMPHETAMINE SACCHARATE, AMPHETAMINE ASPARTATE, DEXTROAMPHETAMINE SULFATE AND AMPHETAMINE SULFATE 5; 5; 5; 5 MG/1; MG/1; MG/1; MG/1
20 TABLET ORAL 2 TIMES DAILY
Qty: 60 TABLET | Refills: 0 | Status: SHIPPED | OUTPATIENT
Start: 2023-11-09 | End: 2023-12-09

## 2023-11-07 NOTE — TELEPHONE ENCOUNTER
Requested Prescriptions     Pending Prescriptions Disp Refills    amphetamine-dextroamphetamine (ADDERALL) 20 MG Oral Tab 60 tablet 0     Sig: Take 1 tablet (20 mg total) by mouth 2 (two) times daily. No protocol     LOV 7/13/23  RTC 3-6 months  Filled 10/10/23 60 tabs 0 refills   No future appointments.

## 2023-12-06 DIAGNOSIS — F90.2 ADHD (ATTENTION DEFICIT HYPERACTIVITY DISORDER), COMBINED TYPE: Chronic | ICD-10-CM

## 2023-12-07 RX ORDER — DEXTROAMPHETAMINE SACCHARATE, AMPHETAMINE ASPARTATE, DEXTROAMPHETAMINE SULFATE AND AMPHETAMINE SULFATE 5; 5; 5; 5 MG/1; MG/1; MG/1; MG/1
20 TABLET ORAL 2 TIMES DAILY
Qty: 60 TABLET | Refills: 0 | Status: SHIPPED | OUTPATIENT
Start: 2023-12-07 | End: 2024-01-06

## 2023-12-07 NOTE — TELEPHONE ENCOUNTER
Requested Prescriptions     Pending Prescriptions Disp Refills    amphetamine-dextroamphetamine (ADDERALL) 20 MG Oral Tab 60 tablet 0     Sig: Take 1 tablet (20 mg total) by mouth 2 (two) times daily. No protocol     LOV 7/13/23  RTC 3-6 months  Filled 11/9/23 60 tabs 0 refills   No future appointments.

## 2024-02-08 NOTE — TELEPHONE ENCOUNTER
Conjuntivae and eyelids appear normal, Sclerae : White without injection Spoke to pharmacy and authorized.

## 2024-02-22 ENCOUNTER — HOSPITAL ENCOUNTER (OUTPATIENT)
Age: 39
Discharge: HOME | End: 2024-02-22
Attending: EMERGENCY MEDICINE
Payer: COMMERCIAL

## 2024-02-22 VITALS
HEART RATE: 85 BPM | SYSTOLIC BLOOD PRESSURE: 110 MMHG | RESPIRATION RATE: 16 BRPM | BODY MASS INDEX: 20.24 KG/M2 | TEMPERATURE: 98 F | DIASTOLIC BLOOD PRESSURE: 76 MMHG | OXYGEN SATURATION: 100 % | WEIGHT: 110 LBS | HEIGHT: 62 IN

## 2024-02-22 DIAGNOSIS — L50.9 URTICARIA: Primary | ICD-10-CM

## 2024-02-22 PROCEDURE — 99204 OFFICE O/P NEW MOD 45 MIN: CPT

## 2024-02-22 PROCEDURE — 99213 OFFICE O/P EST LOW 20 MIN: CPT

## 2024-02-22 RX ORDER — CETIRIZINE HYDROCHLORIDE 10 MG/1
10 TABLET ORAL DAILY
Qty: 30 TABLET | Refills: 0 | Status: SHIPPED | OUTPATIENT
Start: 2024-02-22 | End: 2024-03-23

## 2024-02-22 NOTE — ED PROVIDER NOTES
Patient Seen in: [facility]      History     Chief Complaint   Patient presents with    Rash     Stated Complaint: rash, headache    Subjective:   HPI    38-year-old female presents with itchy rash to various parts of her body starting approximately 2 weeks ago.  Patient states that initially started on her arm but moves around to different parts of her body including her back and leg.  She states the rash will resolve less than a day.  Today she noted a red linear line on her forehead.  She states she was googling her symptoms was worried about bedbugs or scabies as she did stay in several different hotels recently.  She has not tried taking any Benadryl or antihistamines.  She denies any new products, foods, etc.  Denies any respiratory symptoms.    Objective:   Past Medical History:   Diagnosis Date    Allergic rhinitis     Anxiety     Black stools     Clostridium difficile colitis 2019    Depression     Fatigue     Food intolerance     Gestational diabetes ([facility])     Herpes 2017    HSV 2    Infertility, female     Loss of appetite     Pain with bowel movements     Stool incontinence     Supervision of high-risk pregnancy of young primigravida ([facility]) 2018    Uncomfortable fullness after meals               Past Surgical History:   Procedure Laterality Date    OTHER      right knee arthroscopy    OTHER SURGICAL HISTORY Right     right knee surgery     OTHER SURGICAL HISTORY  2017    IUI                Social History     Socioeconomic History    Marital status:    Tobacco Use    Smoking status: Former     Packs/day: 1     Types: Cigarettes     Quit date:      Years since quittin.1    Smokeless tobacco: Never   Vaping Use    Vaping Use: Every day    Start date: 2021    Substances: Nicotine    Devices: Pre-filled or refillable cartridge   Substance and Sexual Activity    Alcohol use: No     Alcohol/week: 0.0 standard drinks of alcohol    Drug use: No    Sexual  activity: Yes     Partners: Male   Social History Narrative    ** Merged History Encounter **                   Review of Systems    Positive for stated complaint: rash, headache  Other systems are as noted in HPI.  Constitutional and vital signs reviewed.      All other systems reviewed and negative except as noted above.    Physical Exam     ED Triage Vitals [02/22/24 1013]   /76   Pulse 85   Resp 16   Temp 97.8 °F (36.6 °C)   Temp src Temporal   SpO2 100 %   O2 Device None (Room air)       Current:/76   Pulse 85   Temp 97.8 °F (36.6 °C) (Temporal)   Resp 16   Ht 157.5 cm (5' 2\")   Wt 49.9 kg   LMP 02/18/2024 (Approximate)   SpO2 100%   BMI 20.12 kg/m²         Physical Exam  Vitals and nursing note reviewed.   Constitutional:       Appearance: She is well-developed.   HENT:      Head: Normocephalic and atraumatic.      Mouth/Throat:      Mouth: Mucous membranes are moist.   Eyes:      General: No scleral icterus.  Skin:     General: Skin is warm and dry.   Neurological:      General: No focal deficit present.      Mental Status: She is alert and oriented to person, place, and time.      Cranial Nerves: No cranial nerve deficit.      Motor: No weakness.   Psychiatric:         Mood and Affect: Mood normal.         Behavior: Behavior normal.               ED Course   Labs Reviewed - No data to display                   MDM   38-year-old female presents with itchy rash to various parts of her body starting approximately 2 weeks ago.     Patient showed several photos of the rash on her phone which appear to be urticarial/hives.  She has no visible rash today.  Will give Rx for Zyrtec for symptomatic treatment but patient was advised to follow-up with allergist to determine inciting cause.  Exam today and photos are not consistent with scabies or hives.  Return precaution discussed.      Medical Decision Making      Disposition and Plan     Clinical Impression:  1. Urticaria          Disposition:  Discharge  2/22/2024 10:36 am    Follow-up:  Linh Díaz MD  [address] [facility] [id number] [phone]    Schedule an appointment as soon as possible for a visit       Chase Cortez MD  [address] [place] [id number] [phone]                Medications Prescribed:  Discharge Medication List as of 2/22/2024 10:40 AM        START taking these medications    Details   cetirizine 10 MG Oral Tab Take 1 tablet (10 mg total) by mouth daily., Normal, Disp-30 tablet, R-0

## 2024-06-12 ENCOUNTER — TELEPHONE (OUTPATIENT)
Dept: INTERNAL MEDICINE CLINIC | Facility: CLINIC | Age: 39
End: 2024-06-12

## 2024-06-12 DIAGNOSIS — R23.3 EASY BRUISING: Primary | ICD-10-CM

## 2024-06-12 NOTE — TELEPHONE ENCOUNTER
Called and spoke to patient,    Bruising has been Ongoing for 3 weeks. Patient states she bruises easily but these bruises are different.    Notices the bruising on both shoulders and left collar bone. Not painful or tender to touch.    Patient reports joint pain and waves of nausea. Vomited 4 days in a row. Negative pregnancy test, currently on cycle.       Denies blood thinners and new medications. Denies injury.     There are active labs from last year, do you want to add any other test?

## 2024-06-12 NOTE — TELEPHONE ENCOUNTER
Pt wanting to know if Dr. Díaz is willing to order blood work? If not, she did schedule a f/u to discuss new concerns.     Pt c/o (blue) bruising on her shoulders, back side, and several other areas. Pt is concerned because she says it's never happened before and she's unsure what the cause could be. F/u for triage? Ok to order labs without ofv? Please advise.      Pt requesting cb if orders are provided, she was considering [facility].     Future Appointments   Date Time Provider Department Center   6/27/2024  2:30 PM Linh Díaz MD [facility] [place]

## 2024-06-13 ENCOUNTER — LAB ENCOUNTER (OUTPATIENT)
Dept: LAB | Age: 39
End: 2024-06-13
Attending: INTERNAL MEDICINE
Payer: COMMERCIAL

## 2024-06-13 DIAGNOSIS — R73.03 PREDIABETES: ICD-10-CM

## 2024-06-13 DIAGNOSIS — Z00.00 PREVENTATIVE HEALTH CARE: ICD-10-CM

## 2024-06-13 DIAGNOSIS — R23.3 EASY BRUISING: ICD-10-CM

## 2024-06-13 LAB
ALBUMIN SERPL-MCNC: 4.8 G/DL (ref 3.2–4.8)
ALBUMIN/GLOB SERPL: 1.7 {RATIO} (ref 1–2)
ALP LIVER SERPL-CCNC: 79 U/L
ALT SERPL-CCNC: <7 U/L
ANION GAP SERPL CALC-SCNC: 5 MMOL/L (ref 0–18)
APTT PPP: 28.5 SECONDS (ref 23–36)
AST SERPL-CCNC: 14 U/L (ref ?–34)
BASOPHILS # BLD AUTO: 0.04 X10(3) UL (ref 0–0.2)
BASOPHILS NFR BLD AUTO: 0.6 %
BILIRUB SERPL-MCNC: 0.9 MG/DL (ref 0.3–1.2)
BUN BLD-MCNC: 8 MG/DL (ref 9–23)
BUN/CREAT SERPL: 8.9 (ref 10–20)
CALCIUM BLD-MCNC: 9.9 MG/DL (ref 8.7–10.4)
CHLORIDE SERPL-SCNC: 106 MMOL/L (ref 98–112)
CHOLEST SERPL-MCNC: 152 MG/DL (ref ?–200)
CO2 SERPL-SCNC: 27 MMOL/L (ref 21–32)
CREAT BLD-MCNC: 0.9 MG/DL
DEPRECATED RDW RBC AUTO: 40.4 FL (ref 35.1–46.3)
EGFRCR SERPLBLD CKD-EPI 2021: 84 ML/MIN/1.73M2 (ref 60–?)
EOSINOPHIL # BLD AUTO: 0.11 X10(3) UL (ref 0–0.7)
EOSINOPHIL NFR BLD AUTO: 1.8 %
ERYTHROCYTE [DISTWIDTH] IN BLOOD BY AUTOMATED COUNT: 11.5 % (ref 11–15)
EST. AVERAGE GLUCOSE BLD GHB EST-MCNC: 111 MG/DL (ref 68–126)
FASTING PATIENT LIPID ANSWER: YES
FASTING STATUS PATIENT QL REPORTED: YES
GLOBULIN PLAS-MCNC: 2.9 G/DL (ref 2–3.5)
GLUCOSE BLD-MCNC: 91 MG/DL (ref 70–99)
HBA1C MFR BLD: 5.5 % (ref ?–5.7)
HCT VFR BLD AUTO: 43.1 %
HDLC SERPL-MCNC: 66 MG/DL (ref 40–59)
HGB BLD-MCNC: 14.7 G/DL
IMM GRANULOCYTES # BLD AUTO: 0.03 X10(3) UL (ref 0–1)
IMM GRANULOCYTES NFR BLD: 0.5 %
INR BLD: 1.04 (ref 0.8–1.2)
LDLC SERPL CALC-MCNC: 69 MG/DL (ref ?–100)
LYMPHOCYTES # BLD AUTO: 2.01 X10(3) UL (ref 1–4)
LYMPHOCYTES NFR BLD AUTO: 32.5 %
MCH RBC QN AUTO: 32.5 PG (ref 26–34)
MCHC RBC AUTO-ENTMCNC: 34.1 G/DL (ref 31–37)
MCV RBC AUTO: 95.4 FL
MONOCYTES # BLD AUTO: 0.31 X10(3) UL (ref 0.1–1)
MONOCYTES NFR BLD AUTO: 5 %
NEUTROPHILS # BLD AUTO: 3.68 X10 (3) UL (ref 1.5–7.7)
NEUTROPHILS # BLD AUTO: 3.68 X10(3) UL (ref 1.5–7.7)
NEUTROPHILS NFR BLD AUTO: 59.6 %
NONHDLC SERPL-MCNC: 86 MG/DL (ref ?–130)
OSMOLALITY SERPL CALC.SUM OF ELEC: 284 MOSM/KG (ref 275–295)
PLATELET # BLD AUTO: 232 10(3)UL (ref 150–450)
POTASSIUM SERPL-SCNC: 4.9 MMOL/L (ref 3.5–5.1)
PROT SERPL-MCNC: 7.7 G/DL (ref 5.7–8.2)
PROTHROMBIN TIME: 14.3 SECONDS (ref 11.6–14.8)
RBC # BLD AUTO: 4.52 X10(6)UL
SODIUM SERPL-SCNC: 138 MMOL/L (ref 136–145)
TRIGL SERPL-MCNC: 91 MG/DL (ref 30–149)
TSI SER-ACNC: 1.24 MIU/ML (ref 0.55–4.78)
VLDLC SERPL CALC-MCNC: 14 MG/DL (ref 0–30)
WBC # BLD AUTO: 6.2 X10(3) UL (ref 4–11)

## 2024-06-13 PROCEDURE — 83036 HEMOGLOBIN GLYCOSYLATED A1C: CPT

## 2024-06-13 PROCEDURE — 85610 PROTHROMBIN TIME: CPT

## 2024-06-13 PROCEDURE — 85025 COMPLETE CBC W/AUTO DIFF WBC: CPT

## 2024-06-13 PROCEDURE — 85730 THROMBOPLASTIN TIME PARTIAL: CPT

## 2024-06-13 PROCEDURE — 84443 ASSAY THYROID STIM HORMONE: CPT

## 2024-06-13 PROCEDURE — 80053 COMPREHEN METABOLIC PANEL: CPT

## 2024-06-13 PROCEDURE — 36415 COLL VENOUS BLD VENIPUNCTURE: CPT

## 2024-06-13 PROCEDURE — 80061 LIPID PANEL: CPT

## 2024-12-02 ENCOUNTER — OFFICE VISIT (OUTPATIENT)
Dept: INTERNAL MEDICINE CLINIC | Facility: CLINIC | Age: 39
End: 2024-12-02
Payer: COMMERCIAL

## 2024-12-02 VITALS
SYSTOLIC BLOOD PRESSURE: 104 MMHG | WEIGHT: 113.19 LBS | TEMPERATURE: 98 F | OXYGEN SATURATION: 99 % | HEIGHT: 62 IN | DIASTOLIC BLOOD PRESSURE: 60 MMHG | HEART RATE: 115 BPM | BODY MASS INDEX: 20.83 KG/M2

## 2024-12-02 DIAGNOSIS — G35 MULTIPLE SCLEROSIS (HCC): ICD-10-CM

## 2024-12-02 DIAGNOSIS — R07.89 OTHER CHEST PAIN: Primary | ICD-10-CM

## 2024-12-02 LAB
ATRIAL RATE: 86 BPM
P AXIS: 72 DEGREES
P-R INTERVAL: 132 MS
Q-T INTERVAL: 362 MS
QRS DURATION: 70 MS
QTC CALCULATION (BEZET): 433 MS
R AXIS: 91 DEGREES
T AXIS: 69 DEGREES
VENTRICULAR RATE: 86 BPM

## 2024-12-02 PROCEDURE — 99214 OFFICE O/P EST MOD 30 MIN: CPT | Performed by: INTERNAL MEDICINE

## 2024-12-02 PROCEDURE — 99406 BEHAV CHNG SMOKING 3-10 MIN: CPT | Performed by: INTERNAL MEDICINE

## 2024-12-02 PROCEDURE — 93000 ELECTROCARDIOGRAM COMPLETE: CPT | Performed by: INTERNAL MEDICINE

## 2024-12-02 RX ORDER — DEXTROAMPHETAMINE SACCHARATE, AMPHETAMINE ASPARTATE, DEXTROAMPHETAMINE SULFATE AND AMPHETAMINE SULFATE 3.75; 3.75; 3.75; 3.75 MG/1; MG/1; MG/1; MG/1
1 TABLET ORAL 2 TIMES DAILY
COMMUNITY
Start: 2024-11-27

## 2024-12-02 NOTE — PROGRESS NOTES
Subjective:   Jayla Albarran is a 39 year old female who presents for Follow - Up     The patient complained of tingling sensations in the left arm and left hands yesterday.  She still has some tingling feeling in the left hands now.  It was also associated with blurry vision.    Recently the patient has been unable to get .  She has got autistic child who is 6-year-old.  She reports stress but she does not have any intention to harm herself.  History/Other:    Chief Complaint Reviewed and Verified  No Further Nursing Notes to   Review  Tobacco Reviewed  Allergies Reviewed  Medications Reviewed    Medical History Reviewed  Surgical History Reviewed  OB Status Reviewed    Family History Reviewed  Social History Reviewed         Tobacco:  Social History     Tobacco Use   Smoking Status Former    Current packs/day: 0.00    Types: Cigarettes    Quit date:     Years since quittin.9   Smokeless Tobacco Never     E-Cigarettes/Vaping       Questions Responses    E-Cigarette Use Current Every Day User    Start Date 23          E-Cigarette/Vaping Substances       Questions Responses    Nicotine Yes    THC No    CBD No    Flavoring No          E-Cigarette/Vaping Devices       Questions Responses    Disposable No    Pre-filled or Refillable Cartridge Yes    Refillable Tank No    Pre-filled Pod No           Tobacco cessation counseling for 3-10 minutes (add E/M code #62701).  She smoked tobacco in the past but quit greater than 12 months ago.  Social History     Tobacco Use   Smoking Status Former    Current packs/day: 0.00    Types: Cigarettes    Quit date:     Years since quittin.9   Smokeless Tobacco Never          Current Outpatient Medications   Medication Sig Dispense Refill    amphetamine-dextroamphetamine 15 MG Oral Tab Take 1 tablet (15 mg total) by mouth 2 (two) times daily.           Review of Systems:  Pertinent items are noted in HPI.  A comprehensive review of systems was  negative.      Objective:   /60 (BP Location: Left arm, Patient Position: Sitting, Cuff Size: adult)   Pulse 115   Temp 98.1 °F (36.7 °C) (Temporal)   Ht 5' 2\" (1.575 m)   Wt 113 lb 3.2 oz (51.3 kg)   LMP 11/13/2024 (Exact Date)   SpO2 99%   BMI 20.70 kg/m²  Estimated body mass index is 20.7 kg/m² as calculated from the following:    Height as of this encounter: 5' 2\" (1.575 m).    Weight as of this encounter: 113 lb 3.2 oz (51.3 kg).  /60 (BP Location: Left arm, Patient Position: Sitting, Cuff Size: adult)   Pulse 115   Temp 98.1 °F (36.7 °C) (Temporal)   Ht 5' 2\" (1.575 m)   Wt 113 lb 3.2 oz (51.3 kg)   LMP 11/13/2024 (Exact Date)   SpO2 99%   BMI 20.70 kg/m²   General appearance: alert, appears stated age, and cooperative  Throat: lips, mucosa, and tongue normal; teeth and gums normal  Lungs: clear to auscultation bilaterally  Abdomen: soft, non-tender; bowel sounds normal; no masses,  no organomegaly  Neurologic: Cranial nerves II to XII intact.  Power 5/5 in all extremities.      Assessment & Plan:     1.  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  Since the patient has coexisting blurriness of vision and neurological symptoms involving different brain regions, multiple sclerosis is a differential diagnosis and needs to be ruled out.  MRI of the brain is ordered.  Note that the blurriness today is resolved.    2.  Atypical chest pain: Patient's lipid profile is excellent.  Will check EKG.    3.  Preventive medicine:  -Up-to-date with Pap smear    4.  ADHD: Continue with Adderall    5.  History of depression, currently not in any medication.  She reports reasonable mood.  No intention to harm herself.  Is not interested in antidepressants.    6.  Motivational interview was conducted against the vaping.      No follow-ups on file.    Eric Yun MD, 12/2/2024, 10:14 AM

## (undated) NOTE — Clinical Note
1. IUP @  32w2d  2. Scan consistent with dates 3. No fetal structural abnormalities seen 4.  IVF Gestation

## (undated) NOTE — Clinical Note
Hi, Dr. Richard Barrera! Thank you for referring Mrs. Bryan Multani for rheumatologic evaluation. Please see the discussion portion of my note and let me know if you have any questions.  MAHI [facility]5/3/2019

## (undated) NOTE — Clinical Note
IUP at 23w1d  Normal fetal echocardiogram IVF Gestation  RECOMMENDATIONS: Continue care with Dr. Gwendolyn Harvey Follow-up growth U/S at 28 weeks

## (undated) NOTE — ED AVS SNAPSHOT
Merlene Jamil   MRN: [id number]    Department:  [facility] [place]   Date of Visit:  11/9/2018           Disclosure     Insurance plans vary and the physician(s) referred by the ER may not be covered by your plan.  Please cont tell this physician (or your personal doctor if your instructions are to return to your personal doctor) about any new or lasting problems. The primary care or specialist physician will see patients referred from the [facility] Emergency Department.  Giancarlo Abdi

## (undated) NOTE — LETTER
Date: 8/20/2021    Patient Name: Scooter Chiang    To Whom it may concern: The above patient is seen at the [facility] for treatment of her medical conditions.     She is on a prescription medication that may lead to or cause a positive

## (undated) NOTE — MR AVS SNAPSHOT
[place]  [address] [facility] [address]  [address] [facility] [id number] [phone]               Thank you for choosing us for your health care visit with Tyesha Crum MD.  We are glad to serve you and happy to provide you with this s If you are confident that your benefit plan will not require a referral or authorization, such as [place] Medicaid, please feel free to schedule your appointment immediately.  However, if you are unsure about the requirements for authorization, please wait This list is accurate as of: 6/1/17 12:42 PM.  Always use your most recent med list.                ClomiPHENE Citrate 50 MG Tabs   TAKE 1 TABLET (50 MG TOTAL) BY MOUTH DAILY.    Commonly known as:  CLOMID           MetFORMIN HCl 500 MG Tabs   Take 1 tablet

## (undated) NOTE — ED AVS SNAPSHOT
Milton Reid   MRN: [id number]    Department:  [facility] Emergency Department   Date of Visit:  3/4/2018           Disclosure     Insurance plans vary and the physician(s) referred by the ER may not be covered by your plan.  Please contact yo tell this physician (or your personal doctor if your instructions are to return to your personal doctor) about any new or lasting problems. The primary care or specialist physician will see patients referred from the [facility] Emergency Department.  Alejandro Chaves